# Patient Record
Sex: FEMALE | Race: WHITE | NOT HISPANIC OR LATINO
[De-identification: names, ages, dates, MRNs, and addresses within clinical notes are randomized per-mention and may not be internally consistent; named-entity substitution may affect disease eponyms.]

---

## 2018-11-20 ENCOUNTER — RESULT REVIEW (OUTPATIENT)
Age: 83
End: 2018-11-20

## 2021-07-09 ENCOUNTER — EMERGENCY (EMERGENCY)
Facility: HOSPITAL | Age: 86
LOS: 1 days | Discharge: HOME | End: 2021-07-09
Attending: EMERGENCY MEDICINE | Admitting: EMERGENCY MEDICINE
Payer: MEDICARE

## 2021-07-09 VITALS
RESPIRATION RATE: 17 BRPM | DIASTOLIC BLOOD PRESSURE: 73 MMHG | OXYGEN SATURATION: 95 % | HEART RATE: 56 BPM | SYSTOLIC BLOOD PRESSURE: 159 MMHG | TEMPERATURE: 98 F

## 2021-07-09 VITALS
DIASTOLIC BLOOD PRESSURE: 75 MMHG | SYSTOLIC BLOOD PRESSURE: 157 MMHG | HEIGHT: 61 IN | OXYGEN SATURATION: 97 % | HEART RATE: 62 BPM | TEMPERATURE: 98 F | WEIGHT: 106.92 LBS | RESPIRATION RATE: 16 BRPM

## 2021-07-09 LAB
ALBUMIN SERPL ELPH-MCNC: 3.4 G/DL — SIGNIFICANT CHANGE UP (ref 3.3–5)
ALP SERPL-CCNC: 43 U/L — SIGNIFICANT CHANGE UP (ref 30–120)
ALT FLD-CCNC: 15 U/L DA — SIGNIFICANT CHANGE UP (ref 10–60)
ANION GAP SERPL CALC-SCNC: 4 MMOL/L — LOW (ref 5–17)
APPEARANCE UR: ABNORMAL
AST SERPL-CCNC: 17 U/L — SIGNIFICANT CHANGE UP (ref 10–40)
BACTERIA # UR AUTO: ABNORMAL
BASOPHILS # BLD AUTO: 0.02 K/UL — SIGNIFICANT CHANGE UP (ref 0–0.2)
BASOPHILS NFR BLD AUTO: 0.3 % — SIGNIFICANT CHANGE UP (ref 0–2)
BILIRUB SERPL-MCNC: 0.4 MG/DL — SIGNIFICANT CHANGE UP (ref 0.2–1.2)
BILIRUB UR-MCNC: NEGATIVE — SIGNIFICANT CHANGE UP
BUN SERPL-MCNC: 17 MG/DL — SIGNIFICANT CHANGE UP (ref 7–23)
CALCIUM SERPL-MCNC: 9 MG/DL — SIGNIFICANT CHANGE UP (ref 8.4–10.5)
CHLORIDE SERPL-SCNC: 105 MMOL/L — SIGNIFICANT CHANGE UP (ref 96–108)
CO2 SERPL-SCNC: 33 MMOL/L — HIGH (ref 22–31)
COLOR SPEC: YELLOW — SIGNIFICANT CHANGE UP
CREAT SERPL-MCNC: 0.74 MG/DL — SIGNIFICANT CHANGE UP (ref 0.5–1.3)
DIFF PNL FLD: ABNORMAL
EOSINOPHIL # BLD AUTO: 0.08 K/UL — SIGNIFICANT CHANGE UP (ref 0–0.5)
EOSINOPHIL NFR BLD AUTO: 1.2 % — SIGNIFICANT CHANGE UP (ref 0–6)
EPI CELLS # UR: SIGNIFICANT CHANGE UP
GLUCOSE SERPL-MCNC: 92 MG/DL — SIGNIFICANT CHANGE UP (ref 70–99)
GLUCOSE UR QL: NEGATIVE MG/DL — SIGNIFICANT CHANGE UP
HCT VFR BLD CALC: 38.3 % — SIGNIFICANT CHANGE UP (ref 34.5–45)
HGB BLD-MCNC: 12.5 G/DL — SIGNIFICANT CHANGE UP (ref 11.5–15.5)
IMM GRANULOCYTES NFR BLD AUTO: 0.1 % — SIGNIFICANT CHANGE UP (ref 0–1.5)
KETONES UR-MCNC: NEGATIVE — SIGNIFICANT CHANGE UP
LEUKOCYTE ESTERASE UR-ACNC: ABNORMAL
LYMPHOCYTES # BLD AUTO: 2.15 K/UL — SIGNIFICANT CHANGE UP (ref 1–3.3)
LYMPHOCYTES # BLD AUTO: 31.2 % — SIGNIFICANT CHANGE UP (ref 13–44)
MCHC RBC-ENTMCNC: 30.9 PG — SIGNIFICANT CHANGE UP (ref 27–34)
MCHC RBC-ENTMCNC: 32.6 GM/DL — SIGNIFICANT CHANGE UP (ref 32–36)
MCV RBC AUTO: 94.6 FL — SIGNIFICANT CHANGE UP (ref 80–100)
MONOCYTES # BLD AUTO: 0.49 K/UL — SIGNIFICANT CHANGE UP (ref 0–0.9)
MONOCYTES NFR BLD AUTO: 7.1 % — SIGNIFICANT CHANGE UP (ref 2–14)
NEUTROPHILS # BLD AUTO: 4.14 K/UL — SIGNIFICANT CHANGE UP (ref 1.8–7.4)
NEUTROPHILS NFR BLD AUTO: 60.1 % — SIGNIFICANT CHANGE UP (ref 43–77)
NITRITE UR-MCNC: NEGATIVE — SIGNIFICANT CHANGE UP
NRBC # BLD: 0 /100 WBCS — SIGNIFICANT CHANGE UP (ref 0–0)
PH UR: 7 — SIGNIFICANT CHANGE UP (ref 5–8)
PLATELET # BLD AUTO: 179 K/UL — SIGNIFICANT CHANGE UP (ref 150–400)
POTASSIUM SERPL-MCNC: 4.2 MMOL/L — SIGNIFICANT CHANGE UP (ref 3.5–5.3)
POTASSIUM SERPL-SCNC: 4.2 MMOL/L — SIGNIFICANT CHANGE UP (ref 3.5–5.3)
PROT SERPL-MCNC: 6.7 G/DL — SIGNIFICANT CHANGE UP (ref 6–8.3)
PROT UR-MCNC: 30 MG/DL
RBC # BLD: 4.05 M/UL — SIGNIFICANT CHANGE UP (ref 3.8–5.2)
RBC # FLD: 12.8 % — SIGNIFICANT CHANGE UP (ref 10.3–14.5)
RBC CASTS # UR COMP ASSIST: ABNORMAL /HPF (ref 0–4)
SODIUM SERPL-SCNC: 142 MMOL/L — SIGNIFICANT CHANGE UP (ref 135–145)
SP GR SPEC: 1 — LOW (ref 1.01–1.02)
TROPONIN I SERPL-MCNC: 0 NG/ML — LOW (ref 0.02–0.06)
UROBILINOGEN FLD QL: NEGATIVE MG/DL — SIGNIFICANT CHANGE UP
WBC # BLD: 6.89 K/UL — SIGNIFICANT CHANGE UP (ref 3.8–10.5)
WBC # FLD AUTO: 6.89 K/UL — SIGNIFICANT CHANGE UP (ref 3.8–10.5)
WBC UR QL: ABNORMAL

## 2021-07-09 PROCEDURE — 74174 CTA ABD&PLVS W/CONTRAST: CPT | Mod: 26,ME

## 2021-07-09 PROCEDURE — 93010 ELECTROCARDIOGRAM REPORT: CPT

## 2021-07-09 PROCEDURE — 71275 CT ANGIOGRAPHY CHEST: CPT | Mod: 26,MG

## 2021-07-09 PROCEDURE — 74174 CTA ABD&PLVS W/CONTRAST: CPT

## 2021-07-09 PROCEDURE — 99284 EMERGENCY DEPT VISIT MOD MDM: CPT | Mod: 25

## 2021-07-09 PROCEDURE — 36415 COLL VENOUS BLD VENIPUNCTURE: CPT

## 2021-07-09 PROCEDURE — 81001 URINALYSIS AUTO W/SCOPE: CPT

## 2021-07-09 PROCEDURE — 99285 EMERGENCY DEPT VISIT HI MDM: CPT

## 2021-07-09 PROCEDURE — 80053 COMPREHEN METABOLIC PANEL: CPT

## 2021-07-09 PROCEDURE — G1004: CPT

## 2021-07-09 PROCEDURE — 96366 THER/PROPH/DIAG IV INF ADDON: CPT

## 2021-07-09 PROCEDURE — 71275 CT ANGIOGRAPHY CHEST: CPT

## 2021-07-09 PROCEDURE — 85025 COMPLETE CBC W/AUTO DIFF WBC: CPT

## 2021-07-09 PROCEDURE — 93005 ELECTROCARDIOGRAM TRACING: CPT

## 2021-07-09 PROCEDURE — 96375 TX/PRO/DX INJ NEW DRUG ADDON: CPT

## 2021-07-09 PROCEDURE — 84484 ASSAY OF TROPONIN QUANT: CPT

## 2021-07-09 PROCEDURE — 96365 THER/PROPH/DIAG IV INF INIT: CPT | Mod: XU

## 2021-07-09 RX ORDER — KETOROLAC TROMETHAMINE 30 MG/ML
30 SYRINGE (ML) INJECTION ONCE
Refills: 0 | Status: DISCONTINUED | OUTPATIENT
Start: 2021-07-09 | End: 2021-07-09

## 2021-07-09 RX ORDER — CEFTRIAXONE 500 MG/1
1000 INJECTION, POWDER, FOR SOLUTION INTRAMUSCULAR; INTRAVENOUS ONCE
Refills: 0 | Status: COMPLETED | OUTPATIENT
Start: 2021-07-09 | End: 2021-07-09

## 2021-07-09 RX ORDER — LIDOCAINE 4 G/100G
1 CREAM TOPICAL
Qty: 10 | Refills: 0
Start: 2021-07-09 | End: 2021-07-18

## 2021-07-09 RX ORDER — CEFPODOXIME PROXETIL 100 MG
1 TABLET ORAL
Qty: 14 | Refills: 0
Start: 2021-07-09 | End: 2021-07-15

## 2021-07-09 RX ORDER — MORPHINE SULFATE 50 MG/1
2 CAPSULE, EXTENDED RELEASE ORAL ONCE
Refills: 0 | Status: DISCONTINUED | OUTPATIENT
Start: 2021-07-09 | End: 2021-07-09

## 2021-07-09 RX ADMIN — Medication 30 MILLIGRAM(S): at 16:40

## 2021-07-09 RX ADMIN — CEFTRIAXONE 1000 MILLIGRAM(S): 500 INJECTION, POWDER, FOR SOLUTION INTRAMUSCULAR; INTRAVENOUS at 17:39

## 2021-07-09 RX ADMIN — MORPHINE SULFATE 2 MILLIGRAM(S): 50 CAPSULE, EXTENDED RELEASE ORAL at 15:55

## 2021-07-09 RX ADMIN — Medication 30 MILLIGRAM(S): at 17:00

## 2021-07-09 RX ADMIN — CEFTRIAXONE 100 MILLIGRAM(S): 500 INJECTION, POWDER, FOR SOLUTION INTRAMUSCULAR; INTRAVENOUS at 16:44

## 2021-07-09 RX ADMIN — MORPHINE SULFATE 2 MILLIGRAM(S): 50 CAPSULE, EXTENDED RELEASE ORAL at 15:37

## 2021-07-09 NOTE — ED PROVIDER NOTE - CARE PLAN
Principal Discharge DX:	UTI (urinary tract infection)  Secondary Diagnosis:	Musculoskeletal back pain

## 2021-07-09 NOTE — ED ADULT NURSE NOTE - NSIMPLEMENTINTERV_GEN_ALL_ED
Implemented All Universal Safety Interventions:  [place] to call system. Call bell, personal items and telephone within reach. Instruct patient to call for assistance. Room bathroom lighting operational. Non-slip footwear when patient is off stretcher. Physically safe environment: no spills, clutter or unnecessary equipment. Stretcher in lowest position, wheels locked, appropriate side rails in place.

## 2021-07-09 NOTE — ED PROVIDER NOTE - NSFOLLOWUPINSTRUCTIONS_ED_ALL_ED_FT
Back Pain    Back pain is very common in adults. The cause of back pain is rarely dangerous and the pain often gets better over time. The cause of your back pain may not be known and may include strain of muscles or ligaments, degeneration of the spinal disks, or arthritis. Occasionally the pain may radiate down your leg(s). Over-the-counter medicines to reduce pain and inflammation are often the most helpful. Stretching and remaining active frequently helps the healing process.     SEEK IMMEDIATE MEDICAL CARE IF YOU HAVE ANY OF THE FOLLOWING SYMPTOMS: bowel or bladder control problems, unusual weakness or numbness in your arms or legs, nausea or vomiting, abdominal pain, fever, dizziness/lightheadedness.      Urinary Tract Infection    A urinary tract infection (UTI) is an infection of any part of the urinary tract, which includes the kidneys, ureters, bladder, and urethra. Risk factors include ignoring your need to urinate, wiping back to front if female, being an uncircumcised male, and having diabetes or a weak immune system. Symptoms include frequent urination, pain or burning with urination, foul smelling urine, cloudy urine, pain in the lower abdomen, blood in the urine, and fever. If you were prescribed an antibiotic medicine, take it as told by your health care provider. Do not stop taking the antibiotic even if you start to feel better.    SEEK IMMEDIATE MEDICAL CARE IF YOU HAVE ANY OF THE FOLLOWING SYMPTOMS: severe back or abdominal pain, fever, inability to keep fluids or medicine down, dizziness/lightheadedness, or a change in mental status.    1. TAKE ALL MEDICATIONS AS DIRECTED.  FOR PAIN YOU CAN ALSO TAKE IBUPROFEN (MOTRIN, ADVIL) OR ACETAMINOPHEN (TYLENOL) AS NEEDED, AS DIRECTED ON PACKAGING. REST APPLY HEATING PAD AS NEEDED. NO HEAVY LIFTING OR STRENUOUS ACTIVITY  2. FOLLOW UP WITH __YOUR PRIMARY CARE PROVIDER AND SPINE ORTHOPEDIST________ AS DIRECTED.  YOU WERE GIVEN COPIES OF ALL LABS AND IMAGING RESULTS FROM YOUR ER VISIT--PLEASE TAKE THEM WITH YOU TO YOUR APPOINTMENT.  3. IF NEEDED, CALL [phone] TO FIND A PRIMARY CARE PHYSICIAN.  OR CALL [phone] TO MAKE AN APPOINTMENT WITH THE MEDICAL CLINIC.  4. RETURN TO THE ER FOR ANY WORSENING SYMPTOMS.

## 2021-07-09 NOTE — ED PROVIDER NOTE - PATIENT PORTAL LINK FT
You can access the FollowMyHealth Patient Portal offered by [facility] by registering at the following website: [email or web address]. By joining [facility]’s FollowMyHealth portal, you will also be able to view your health information using other applications (apps) compatible with our system.

## 2021-07-09 NOTE — ED PROVIDER NOTE - OBJECTIVE STATEMENT
88 y/o F poor historian states here for back pain that started today suddenly. Denies fall or injury. Denies heavy lifting. States had back pain in the past but never this bad. Denies nausea vomiting chest pain but pain radiates to L shoulder. Denies hematuria and dysuria. Pain worse with movement but unable to further explain can not quantify pain. States took Tylenol with no relief.

## 2021-07-09 NOTE — ED PROVIDER NOTE - PHYSICAL EXAMINATION
no CVAT  pelvis stable  no midline c/t/l spine tenderness   pt in mild distress rolling around stretcher minimally cooperative with exam

## 2021-07-09 NOTE — ED PROVIDER NOTE - CLINICAL SUMMARY MEDICAL DECISION MAKING FREE TEXT BOX
88 y/o F with back pain, states started today when she wasn't doing anything, denies fever chills hematuria or dysuria, patient unable to describe the pain well, denies CP nausea vomiting, the pain radiates to shoulder on xa pain seems to be colicky in nature worse with  movement, plan= will do labs urine and CT to r.o stone but also concern for dissection as pt repots pain radiates to shoulder

## 2021-07-09 NOTE — ED PROVIDER NOTE - CARE PROVIDER_API CALL
Gary Chavis  ORTHOPAEDIC SURGERY  [facility], [address] [place] [address]  Phone: [phone]  Fax: [phone]  Follow Up Time: 1-3 Days

## 2021-07-09 NOTE — ED PROVIDER NOTE - EKG #1 DATE/TIME
Pt also needs order for MRI of right knee to be done at [facility]. 09-Jul-2021 15:50

## 2021-07-09 NOTE — ED PROVIDER NOTE - PROGRESS NOTE DETAILS
Ramos CHESTER for ED attending, Dr. Barfield: 86 y/o F w/ no pertinent PMHx presents to ED c/o back pain. Denies numbness . Pt is COVID vaccinated. PCP: Rober Trivedi Reevaluated patient at bedside.  Patient feeling much improved. Daughter now at [facility]. States her pain is chronic this has been going on for a while, take 7.5mg vicodins twice daily with no relief. Feels this is just progression of her arthritis. Feels pt needs higher dose of pain meds. Discussed the results of all diagnostic testing in ED and copies of all reports given.   An opportunity to ask questions was given.  Discussed the importance of prompt, close medical follow-up.  Advised should follow up with spine. Patient will return with any changes, concerns or persistent / worsening symptoms.  Understanding of all instructions verbalized. Ramos CHESTER for ED attending, Dr. Barfield: 86 y/o F w/ no pertinent PMHx presents to ED c/o back pain. Denies numbness . No fall / direct trauma. Pt with hx similar chronic pain per family. No cough/uri. No known covid exposuers. Pt is COVID vaccinated. No URI sx. No numb/ting/focal weak. NO recent illness. no other  inj or co. PCP: Rober Trivedi  Exam: MM Moist. neck supple. no meningeal signs. Pos tend to L lower thoracic back, L>R, nl rsp effort. cta bl no w/r/r. Nl cardiac exam. abd soft NT. no hsm. no cvat. nl non-focal neuro exam. No other acute findings. check labs, xr, CT, reeval

## 2022-03-03 NOTE — ED ADULT TRIAGE NOTE - BSA (M2)
[de-identified] : The right elbow was prepped with alcohol and ethyl chloride was used to numb the skin. A 3 cc injection with 1 cc xylocaine, 1cc sensoricaine and 1 cc depomedrol , was given without complication into the [medial/latera] epicondyle. Patient instructed that there may be an inflammatory flare for 24 hrs , to use ice or advil if needed\par \par 
1.45

## 2022-07-24 ENCOUNTER — INPATIENT (INPATIENT)
Facility: HOSPITAL | Age: 87
LOS: 2 days | Discharge: HOME | DRG: 392 | End: 2022-07-27
Attending: FAMILY MEDICINE | Admitting: STUDENT IN AN ORGANIZED HEALTH CARE EDUCATION/TRAINING PROGRAM
Payer: MEDICARE

## 2022-07-24 VITALS
DIASTOLIC BLOOD PRESSURE: 63 MMHG | HEART RATE: 76 BPM | RESPIRATION RATE: 16 BRPM | WEIGHT: 110.01 LBS | OXYGEN SATURATION: 96 % | TEMPERATURE: 98 F | SYSTOLIC BLOOD PRESSURE: 137 MMHG | HEIGHT: 61 IN

## 2022-07-24 DIAGNOSIS — Z98.890 OTHER SPECIFIED POSTPROCEDURAL STATES: Chronic | ICD-10-CM

## 2022-07-24 DIAGNOSIS — Z90.49 ACQUIRED ABSENCE OF OTHER SPECIFIED PARTS OF DIGESTIVE TRACT: Chronic | ICD-10-CM

## 2022-07-24 LAB
ALBUMIN SERPL ELPH-MCNC: 3.3 G/DL — SIGNIFICANT CHANGE UP (ref 3.3–5)
ALP SERPL-CCNC: 51 U/L — SIGNIFICANT CHANGE UP (ref 40–120)
ALT FLD-CCNC: 9 U/L — LOW (ref 12–78)
ANION GAP SERPL CALC-SCNC: 5 MMOL/L — SIGNIFICANT CHANGE UP (ref 5–17)
AST SERPL-CCNC: 18 U/L — SIGNIFICANT CHANGE UP (ref 15–37)
BASOPHILS # BLD AUTO: 0.03 K/UL — SIGNIFICANT CHANGE UP (ref 0–0.2)
BASOPHILS NFR BLD AUTO: 0.3 % — SIGNIFICANT CHANGE UP (ref 0–2)
BILIRUB SERPL-MCNC: 0.7 MG/DL — SIGNIFICANT CHANGE UP (ref 0.2–1.2)
BUN SERPL-MCNC: 20 MG/DL — SIGNIFICANT CHANGE UP (ref 7–23)
CALCIUM SERPL-MCNC: 9.1 MG/DL — SIGNIFICANT CHANGE UP (ref 8.5–10.1)
CHLORIDE SERPL-SCNC: 103 MMOL/L — SIGNIFICANT CHANGE UP (ref 96–108)
CO2 SERPL-SCNC: 29 MMOL/L — SIGNIFICANT CHANGE UP (ref 22–31)
CREAT SERPL-MCNC: 0.7 MG/DL — SIGNIFICANT CHANGE UP (ref 0.5–1.3)
EGFR: 83 ML/MIN/1.73M2 — SIGNIFICANT CHANGE UP
EOSINOPHIL # BLD AUTO: 0.05 K/UL — SIGNIFICANT CHANGE UP (ref 0–0.5)
EOSINOPHIL NFR BLD AUTO: 0.5 % — SIGNIFICANT CHANGE UP (ref 0–6)
GLUCOSE SERPL-MCNC: 105 MG/DL — HIGH (ref 70–99)
HCT VFR BLD CALC: 34.5 % — SIGNIFICANT CHANGE UP (ref 34.5–45)
HGB BLD-MCNC: 11.3 G/DL — LOW (ref 11.5–15.5)
IMM GRANULOCYTES NFR BLD AUTO: 0.3 % — SIGNIFICANT CHANGE UP (ref 0–1.5)
LACTATE SERPL-SCNC: 0.9 MMOL/L — SIGNIFICANT CHANGE UP (ref 0.7–2)
LIDOCAIN IGE QN: 134 U/L — SIGNIFICANT CHANGE UP (ref 73–393)
LYMPHOCYTES # BLD AUTO: 2.31 K/UL — SIGNIFICANT CHANGE UP (ref 1–3.3)
LYMPHOCYTES # BLD AUTO: 22 % — SIGNIFICANT CHANGE UP (ref 13–44)
MCHC RBC-ENTMCNC: 31.1 PG — SIGNIFICANT CHANGE UP (ref 27–34)
MCHC RBC-ENTMCNC: 32.8 GM/DL — SIGNIFICANT CHANGE UP (ref 32–36)
MCV RBC AUTO: 95 FL — SIGNIFICANT CHANGE UP (ref 80–100)
MONOCYTES # BLD AUTO: 0.95 K/UL — HIGH (ref 0–0.9)
MONOCYTES NFR BLD AUTO: 9 % — SIGNIFICANT CHANGE UP (ref 2–14)
NEUTROPHILS # BLD AUTO: 7.14 K/UL — SIGNIFICANT CHANGE UP (ref 1.8–7.4)
NEUTROPHILS NFR BLD AUTO: 67.9 % — SIGNIFICANT CHANGE UP (ref 43–77)
NRBC # BLD: 0 /100 WBCS — SIGNIFICANT CHANGE UP (ref 0–0)
PLATELET # BLD AUTO: 224 K/UL — SIGNIFICANT CHANGE UP (ref 150–400)
POTASSIUM SERPL-MCNC: 5.2 MMOL/L — SIGNIFICANT CHANGE UP (ref 3.5–5.3)
POTASSIUM SERPL-SCNC: 5.2 MMOL/L — SIGNIFICANT CHANGE UP (ref 3.5–5.3)
PROT SERPL-MCNC: 7.6 G/DL — SIGNIFICANT CHANGE UP (ref 6–8.3)
RBC # BLD: 3.63 M/UL — LOW (ref 3.8–5.2)
RBC # FLD: 13.3 % — SIGNIFICANT CHANGE UP (ref 10.3–14.5)
SODIUM SERPL-SCNC: 137 MMOL/L — SIGNIFICANT CHANGE UP (ref 135–145)
WBC # BLD: 10.51 K/UL — HIGH (ref 3.8–10.5)
WBC # FLD AUTO: 10.51 K/UL — HIGH (ref 3.8–10.5)

## 2022-07-24 PROCEDURE — 71045 X-RAY EXAM CHEST 1 VIEW: CPT | Mod: 26

## 2022-07-24 PROCEDURE — 93010 ELECTROCARDIOGRAM REPORT: CPT

## 2022-07-24 PROCEDURE — 99285 EMERGENCY DEPT VISIT HI MDM: CPT

## 2022-07-24 RX ORDER — ACETAMINOPHEN 500 MG
1000 TABLET ORAL ONCE
Refills: 0 | Status: COMPLETED | OUTPATIENT
Start: 2022-07-24 | End: 2022-07-24

## 2022-07-24 RX ORDER — SODIUM CHLORIDE 9 MG/ML
1000 INJECTION INTRAMUSCULAR; INTRAVENOUS; SUBCUTANEOUS ONCE
Refills: 0 | Status: COMPLETED | OUTPATIENT
Start: 2022-07-24 | End: 2022-07-24

## 2022-07-24 RX ADMIN — Medication 400 MILLIGRAM(S): at 23:19

## 2022-07-24 RX ADMIN — SODIUM CHLORIDE 1000 MILLILITER(S): 9 INJECTION INTRAMUSCULAR; INTRAVENOUS; SUBCUTANEOUS at 23:20

## 2022-07-24 RX ADMIN — Medication 1000 MILLIGRAM(S): at 23:34

## 2022-07-24 NOTE — ED PROVIDER NOTE - OBJECTIVE STATEMENT
87yo F ho chronic back pain on tramadol and celecoxib pw 3 days worsneing lower abd pain, no nausea, vomiting, fevers, chills, no urinary sx, + normal BM, + ho appendectomy and hernia repair, no chest pain or sob 89yo F ho chronic back pain on tramadol and celecoxib pw 3 days worsening lower abd pain, no nausea, vomiting, fevers, chills, no urinary sx, + normal BM, + ho appendectomy and hernia repair, no chest pain or sob

## 2022-07-24 NOTE — ED ADULT NURSE NOTE - OBJECTIVE STATEMENT
stated she had abdominal pain for 3 days> left upper quad and mid 10/10 worsening. Denies nausea or vomiting. Last BM was small> yesterday. No fever or difficulty urinating, no diarrhea stated she had abdominal pain for 3 days> left upper quad and mid 10/10 worsening. Denies nausea or vomiting. Last BM was small> yesterday. No fever or difficulty urinating, no diarrhea  0725: Received pt alert and oriented resting in stretcher.  Pt denies n/v, states abd discomfort is currently at an acceptable level.   Respirations even and unlabored at this time.  Pt appears in no acute distress, watching TV, offers no complaints.  Will continue frequent monitoring.  BN stated she had abdominal pain for 3 days> left upper quad and mid 10/10 worsening. Denies nausea or vomiting. Last BM was small> yesterday. No fever or difficulty urinating, no diarrhea  0725: Received pt alert and oriented resting in stretcher.  Pt denies n/v, states abd discomfort is currently at an acceptable level.   Respirations even and unlabored at this time.  Pt appears in no acute distress, watching TV, offers no complaints.  Will continue frequent monitoring.  BN  1400: Pt oob ambulatory to BR with steady gait respirtaitons even and unlabored.   Pt calm and cooperative denies need for pain meds at this time. BN   1834: pt tolerating liquid diet at this time, denies acute abd pain, pt calm watching tv safety maintained. BN

## 2022-07-24 NOTE — ED PROVIDER NOTE - PHYSICAL EXAMINATION
Gen: Well appearing in NAD  Head: NC/AT  Neck: trachea midline  Resp:  No distress, wheezes on right side  abd: tender in lower abdomen L>R  Ext: no deformities  Neuro:  A&O appears non focal  Skin:  Warm and dry as visualized  Psych:  Normal affect and mood

## 2022-07-25 DIAGNOSIS — Z29.9 ENCOUNTER FOR PROPHYLACTIC MEASURES, UNSPECIFIED: ICD-10-CM

## 2022-07-25 DIAGNOSIS — K57.92 DIVERTICULITIS OF INTESTINE, PART UNSPECIFIED, WITHOUT PERFORATION OR ABSCESS WITHOUT BLEEDING: ICD-10-CM

## 2022-07-25 DIAGNOSIS — M54.9 DORSALGIA, UNSPECIFIED: ICD-10-CM

## 2022-07-25 DIAGNOSIS — N39.0 URINARY TRACT INFECTION, SITE NOT SPECIFIED: ICD-10-CM

## 2022-07-25 DIAGNOSIS — R82.90 UNSPECIFIED ABNORMAL FINDINGS IN URINE: ICD-10-CM

## 2022-07-25 DIAGNOSIS — D64.9 ANEMIA, UNSPECIFIED: ICD-10-CM

## 2022-07-25 LAB
ALBUMIN SERPL ELPH-MCNC: 2.7 G/DL — LOW (ref 3.3–5)
ALP SERPL-CCNC: 44 U/L — SIGNIFICANT CHANGE UP (ref 40–120)
ALT FLD-CCNC: 7 U/L — LOW (ref 12–78)
ANION GAP SERPL CALC-SCNC: 6 MMOL/L — SIGNIFICANT CHANGE UP (ref 5–17)
APPEARANCE UR: ABNORMAL
APTT BLD: 25.4 SEC — LOW (ref 27.5–35.5)
AST SERPL-CCNC: 14 U/L — LOW (ref 15–37)
BACTERIA # UR AUTO: ABNORMAL
BASOPHILS # BLD AUTO: 0.02 K/UL — SIGNIFICANT CHANGE UP (ref 0–0.2)
BASOPHILS NFR BLD AUTO: 0.3 % — SIGNIFICANT CHANGE UP (ref 0–2)
BILIRUB SERPL-MCNC: 0.7 MG/DL — SIGNIFICANT CHANGE UP (ref 0.2–1.2)
BILIRUB UR-MCNC: NEGATIVE — SIGNIFICANT CHANGE UP
BLD GP AB SCN SERPL QL: SIGNIFICANT CHANGE UP
BUN SERPL-MCNC: 16 MG/DL — SIGNIFICANT CHANGE UP (ref 7–23)
CALCIUM SERPL-MCNC: 8.1 MG/DL — LOW (ref 8.5–10.1)
CHLORIDE SERPL-SCNC: 109 MMOL/L — HIGH (ref 96–108)
CO2 SERPL-SCNC: 27 MMOL/L — SIGNIFICANT CHANGE UP (ref 22–31)
COLOR SPEC: YELLOW — SIGNIFICANT CHANGE UP
COMMENT - URINE: SIGNIFICANT CHANGE UP
CREAT SERPL-MCNC: 0.53 MG/DL — SIGNIFICANT CHANGE UP (ref 0.5–1.3)
DIFF PNL FLD: ABNORMAL
EGFR: 89 ML/MIN/1.73M2 — SIGNIFICANT CHANGE UP
EOSINOPHIL # BLD AUTO: 0.11 K/UL — SIGNIFICANT CHANGE UP (ref 0–0.5)
EOSINOPHIL NFR BLD AUTO: 1.4 % — SIGNIFICANT CHANGE UP (ref 0–6)
EPI CELLS # UR: SIGNIFICANT CHANGE UP
GLUCOSE SERPL-MCNC: 98 MG/DL — SIGNIFICANT CHANGE UP (ref 70–99)
GLUCOSE UR QL: NEGATIVE — SIGNIFICANT CHANGE UP
HCT VFR BLD CALC: 32 % — LOW (ref 34.5–45)
HGB BLD-MCNC: 10.5 G/DL — LOW (ref 11.5–15.5)
IMM GRANULOCYTES NFR BLD AUTO: 0.4 % — SIGNIFICANT CHANGE UP (ref 0–1.5)
INR BLD: 1.24 RATIO — HIGH (ref 0.88–1.16)
KETONES UR-MCNC: NEGATIVE — SIGNIFICANT CHANGE UP
LEUKOCYTE ESTERASE UR-ACNC: ABNORMAL
LYMPHOCYTES # BLD AUTO: 1.65 K/UL — SIGNIFICANT CHANGE UP (ref 1–3.3)
LYMPHOCYTES # BLD AUTO: 21 % — SIGNIFICANT CHANGE UP (ref 13–44)
MCHC RBC-ENTMCNC: 31.3 PG — SIGNIFICANT CHANGE UP (ref 27–34)
MCHC RBC-ENTMCNC: 32.8 GM/DL — SIGNIFICANT CHANGE UP (ref 32–36)
MCV RBC AUTO: 95.5 FL — SIGNIFICANT CHANGE UP (ref 80–100)
MONOCYTES # BLD AUTO: 0.68 K/UL — SIGNIFICANT CHANGE UP (ref 0–0.9)
MONOCYTES NFR BLD AUTO: 8.7 % — SIGNIFICANT CHANGE UP (ref 2–14)
NEUTROPHILS # BLD AUTO: 5.37 K/UL — SIGNIFICANT CHANGE UP (ref 1.8–7.4)
NEUTROPHILS NFR BLD AUTO: 68.2 % — SIGNIFICANT CHANGE UP (ref 43–77)
NITRITE UR-MCNC: NEGATIVE — SIGNIFICANT CHANGE UP
NRBC # BLD: 0 /100 WBCS — SIGNIFICANT CHANGE UP (ref 0–0)
NT-PROBNP SERPL-SCNC: 611 PG/ML — HIGH (ref 0–450)
PH UR: 6 — SIGNIFICANT CHANGE UP (ref 5–8)
PLATELET # BLD AUTO: 196 K/UL — SIGNIFICANT CHANGE UP (ref 150–400)
POTASSIUM SERPL-MCNC: 3.8 MMOL/L — SIGNIFICANT CHANGE UP (ref 3.5–5.3)
POTASSIUM SERPL-SCNC: 3.8 MMOL/L — SIGNIFICANT CHANGE UP (ref 3.5–5.3)
PROT SERPL-MCNC: 6.4 G/DL — SIGNIFICANT CHANGE UP (ref 6–8.3)
PROT UR-MCNC: NEGATIVE — SIGNIFICANT CHANGE UP
PROTHROM AB SERPL-ACNC: 14.5 SEC — HIGH (ref 10.5–13.4)
RBC # BLD: 3.35 M/UL — LOW (ref 3.8–5.2)
RBC # FLD: 13.4 % — SIGNIFICANT CHANGE UP (ref 10.3–14.5)
RBC CASTS # UR COMP ASSIST: ABNORMAL /HPF (ref 0–4)
SARS-COV-2 RNA SPEC QL NAA+PROBE: SIGNIFICANT CHANGE UP
SODIUM SERPL-SCNC: 142 MMOL/L — SIGNIFICANT CHANGE UP (ref 135–145)
SP GR SPEC: 1 — LOW (ref 1.01–1.02)
UROBILINOGEN FLD QL: NEGATIVE — SIGNIFICANT CHANGE UP
WBC # BLD: 7.86 K/UL — SIGNIFICANT CHANGE UP (ref 3.8–10.5)
WBC # FLD AUTO: 7.86 K/UL — SIGNIFICANT CHANGE UP (ref 3.8–10.5)
WBC UR QL: >50

## 2022-07-25 PROCEDURE — 74177 CT ABD & PELVIS W/CONTRAST: CPT | Mod: 26,MA

## 2022-07-25 PROCEDURE — 99222 1ST HOSP IP/OBS MODERATE 55: CPT

## 2022-07-25 PROCEDURE — 99223 1ST HOSP IP/OBS HIGH 75: CPT | Mod: GC

## 2022-07-25 RX ORDER — PIPERACILLIN AND TAZOBACTAM 4; .5 G/20ML; G/20ML
3.38 INJECTION, POWDER, LYOPHILIZED, FOR SOLUTION INTRAVENOUS EVERY 8 HOURS
Refills: 0 | Status: DISCONTINUED | OUTPATIENT
Start: 2022-07-25 | End: 2022-07-27

## 2022-07-25 RX ORDER — TRAMADOL HYDROCHLORIDE 50 MG/1
1 TABLET ORAL
Qty: 0 | Refills: 0 | DISCHARGE

## 2022-07-25 RX ORDER — GABAPENTIN 400 MG/1
1 CAPSULE ORAL
Qty: 0 | Refills: 0 | DISCHARGE

## 2022-07-25 RX ORDER — HEPARIN SODIUM 5000 [USP'U]/ML
5000 INJECTION INTRAVENOUS; SUBCUTANEOUS EVERY 8 HOURS
Refills: 0 | Status: DISCONTINUED | OUTPATIENT
Start: 2022-07-25 | End: 2022-07-25

## 2022-07-25 RX ORDER — CELECOXIB 200 MG/1
100 CAPSULE ORAL DAILY
Refills: 0 | Status: DISCONTINUED | OUTPATIENT
Start: 2022-07-25 | End: 2022-07-27

## 2022-07-25 RX ORDER — SODIUM CHLORIDE 9 MG/ML
1000 INJECTION, SOLUTION INTRAVENOUS
Refills: 0 | Status: DISCONTINUED | OUTPATIENT
Start: 2022-07-25 | End: 2022-07-25

## 2022-07-25 RX ORDER — ONDANSETRON 8 MG/1
4 TABLET, FILM COATED ORAL EVERY 8 HOURS
Refills: 0 | Status: DISCONTINUED | OUTPATIENT
Start: 2022-07-25 | End: 2022-07-27

## 2022-07-25 RX ORDER — ACETAMINOPHEN 500 MG
650 TABLET ORAL EVERY 6 HOURS
Refills: 0 | Status: DISCONTINUED | OUTPATIENT
Start: 2022-07-25 | End: 2022-07-27

## 2022-07-25 RX ORDER — PIPERACILLIN AND TAZOBACTAM 4; .5 G/20ML; G/20ML
3.38 INJECTION, POWDER, LYOPHILIZED, FOR SOLUTION INTRAVENOUS ONCE
Refills: 0 | Status: COMPLETED | OUTPATIENT
Start: 2022-07-25 | End: 2022-07-25

## 2022-07-25 RX ORDER — LANOLIN ALCOHOL/MO/W.PET/CERES
3 CREAM (GRAM) TOPICAL AT BEDTIME
Refills: 0 | Status: DISCONTINUED | OUTPATIENT
Start: 2022-07-25 | End: 2022-07-27

## 2022-07-25 RX ORDER — TRAMADOL HYDROCHLORIDE 50 MG/1
50 TABLET ORAL EVERY 6 HOURS
Refills: 0 | Status: DISCONTINUED | OUTPATIENT
Start: 2022-07-25 | End: 2022-07-27

## 2022-07-25 RX ORDER — LIDOCAINE 4 G/100G
1 CREAM TOPICAL EVERY 24 HOURS
Refills: 0 | Status: DISCONTINUED | OUTPATIENT
Start: 2022-07-25 | End: 2022-07-27

## 2022-07-25 RX ORDER — ENOXAPARIN SODIUM 100 MG/ML
40 INJECTION SUBCUTANEOUS EVERY 24 HOURS
Refills: 0 | Status: DISCONTINUED | OUTPATIENT
Start: 2022-07-25 | End: 2022-07-27

## 2022-07-25 RX ORDER — TRAMADOL HYDROCHLORIDE 50 MG/1
50 TABLET ORAL EVERY 6 HOURS
Refills: 0 | Status: DISCONTINUED | OUTPATIENT
Start: 2022-07-25 | End: 2022-07-25

## 2022-07-25 RX ORDER — CELECOXIB 200 MG/1
1 CAPSULE ORAL
Qty: 0 | Refills: 0 | DISCHARGE

## 2022-07-25 RX ADMIN — TRAMADOL HYDROCHLORIDE 50 MILLIGRAM(S): 50 TABLET ORAL at 23:45

## 2022-07-25 RX ADMIN — ENOXAPARIN SODIUM 40 MILLIGRAM(S): 100 INJECTION SUBCUTANEOUS at 05:13

## 2022-07-25 RX ADMIN — SODIUM CHLORIDE 50 MILLILITER(S): 9 INJECTION, SOLUTION INTRAVENOUS at 05:13

## 2022-07-25 RX ADMIN — CELECOXIB 100 MILLIGRAM(S): 200 CAPSULE ORAL at 22:26

## 2022-07-25 RX ADMIN — PIPERACILLIN AND TAZOBACTAM 200 GRAM(S): 4; .5 INJECTION, POWDER, LYOPHILIZED, FOR SOLUTION INTRAVENOUS at 01:58

## 2022-07-25 RX ADMIN — PIPERACILLIN AND TAZOBACTAM 25 GRAM(S): 4; .5 INJECTION, POWDER, LYOPHILIZED, FOR SOLUTION INTRAVENOUS at 12:21

## 2022-07-25 RX ADMIN — TRAMADOL HYDROCHLORIDE 50 MILLIGRAM(S): 50 TABLET ORAL at 23:07

## 2022-07-25 RX ADMIN — PIPERACILLIN AND TAZOBACTAM 25 GRAM(S): 4; .5 INJECTION, POWDER, LYOPHILIZED, FOR SOLUTION INTRAVENOUS at 20:58

## 2022-07-25 NOTE — CONSULT NOTE ADULT - SUBJECTIVE AND OBJECTIVE BOX
HPI:  89 y/o F with PMHx chronic back pain (on Tramadol and Celebrex at home), s/p hernia repair and appendectomy presents with worsening abdominal pain x 2 days. Pain located in LLQ and described as severe and non-radiating. Normal BMs - last one yesterday morning. No change in appetite. Never had colonoscopy. Denies fever, chills, chest pain, shortness of breath, nausea, vomiting, diarrhea, urinary complaints, history of similar symptoms/diagnosis.    PAST MEDICAL & SURGICAL HISTORY:  Chronic back pain  S/P hernia repair  S/P appendectomy      SOCIAL:  Denies smoking and history of smoking. Denies ETOH use and drug use.     ALLERGIES:  No Known Allergies    Home Medications:  gabapentin 300 mg oral capsule: 1 cap(s) orally once a day (2022 21:41)  Percocet 7.5/325 oral tablet: 1 tab(s) orally once a day (2022 21:41)    REVIEW OF SYSTEMS:  CONSTITUTIONAL: No weakness, fevers or chills  EYES/ENT: No visual changes;  No vertigo or throat pain   NECK: No pain or stiffness  RESPIRATORY: No cough, wheezing, hemoptysis; No shortness of breath  CARDIOVASCULAR: No chest pain or palpitations  GASTROINTESTINAL: SEE HPI  GENITOURINARY: No dysuria, frequency or hematuria  NEUROLOGICAL: No numbness or weakness  SKIN: No itching, burning, rashes, or lesions   All other review of systems is negative unless indicated above.    Vital Signs Last 24 Hrs  T(C): 36.7 (2022 21:36), Max: 36.7 (2022 21:36)  T(F): 98.1 (2022 21:36), Max: 98.1 (2022 21:36)  HR: 76 (2022 21:36) (76 - 76)  BP: 137/63 (2022 21:36) (137/63 - 137/63)  BP(mean): --  RR: 16 (2022 21:36) (16 - 16)  SpO2: 96% (2022 21:36) (96% - 96%)    Parameters below as of 2022 21:36  Patient On (Oxygen Delivery Method): room air      PHYSICAL EXAM:  General: Elderly female, NAD, appears comfortable  HEAD: NC, AT  NECK: Supple  CHEST: Decreased BS b/l. Good inspiratory effort.  HEART: RRR. +S1/S2.  ABDOMEN: Soft. Moderate tenderness lower abdomen, L>R. No rebound or guarding. +BS  EXT: No calf tenderness b/l.  NEURO: A&Ox      LABS:                        11.3   10.51 )-----------( 224      ( 2022 23:24 )             34.5     PT/INR - ( 2022 23:24 )   PT: 14.5 sec;   INR: 1.24 ratio    PTT - ( 2022 23:24 )  PTT:25.4 sec    LIVER FUNCTIONS - ( 2022 23:24 )  Alb: 3.3 g/dL / Pro: 7.6 g/dL / ALK PHOS: 51 U/L / ALT: 9 U/L / AST: 18 U/L / GGT: x           Urinalysis Basic - ( 2022 00:46 )  Color: Yellow / Appearance: Slightly Turbid / S.005 / pH: x  Gluc: x / Ketone: Negative  / Bili: Negative / Urobili: Negative   Blood: x / Protein: Negative / Nitrite: Negative   Leuk Esterase: Moderate / RBC: 3-5 /HPF / WBC >50   Sq Epi: x / Non Sq Epi: Few / Bacteria: Occasional      RADIOLOGY/IMAGING:    < from: CT Abdomen and Pelvis w/ IV Cont (22 @ 01:27) >  ACC: [id number] EXAM:  CT ABDOMEN AND PELVIS IC                        PROCEDURE DATE:  2022    INTERPRETATION:  CLINICAL INFORMATION: Left lower quadrant pain.  COMPARISON: 2021.  CONTRAST/COMPLICATIONS:  IV Contrast: [id number]  90 cc administered   10 cc discarded  Oral Contrast: NONE  Complications: None reported at time of study completion  PROCEDURE:  CT of the Abdomen and Pelvis was performed without and with intravenous   contrast.  Sagittal and coronal reformats were performed.    FINDINGS:  LOWER CHEST: Bibasilar dependent and platelike atelectasis. Heart is   enlarged. Coronary artery atherosclerotic calcifications. Calcifications   of the aortic valve leaflets.    LIVER: Within normal limits.  BILE DUCTS: Normal caliber.  GALLBLADDER: Within normal limits.  SPLEEN: Within normal limits.  PANCREAS: Within normal limits.  ADRENALS: Within normal limits.  KIDNEYS/URETERS: Kidneys enhance symmetrically without hydronephrosis.   Subcentimeter low-attenuation lesions in the right kidney which are too   small to characterize.    BLADDER: Within normal limits.  REPRODUCTIVE ORGANS: Vaginal pessary device in place. Uterus and adnexa   are unremarkable.    BOWEL: No bowel obstruction. Colonic diverticulosis with short segment   thickening of the proximal sigmoid colon with mild surrounding   inflammatory change about an inflamed diverticulum.  PERITONEUM: No ascites, pneumoperitoneum, or loculated collection. No   mesenteric lymphadenopathy.  VESSELS: Atherosclerotic calcifications of the aortoiliac tree. Normal   caliber abdominal aorta.  RETROPERITONEUM/LYMPH NODES: No lymphadenopathy.  ABDOMINAL WALL: Within normal limits.  BONES: Degenerative changes of the spine. 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    IMPRESSION:  Acute uncomplicated proximal sigmoid diverticulitis.    --- End of Report ---      ADRYAN WALSH DO; Attending Radiologist  This document has been electronically signed. 2022  1:38AM  < end of copied text >

## 2022-07-25 NOTE — PATIENT PROFILE ADULT - NSPRESCRALCSCORE_GEN_A_NUR_CAL
pt sent to ED for abnormal blood work.  pt went to her PMD for sick visit, dizziness, weakness and was found to have low H&H
2

## 2022-07-25 NOTE — CHART NOTE - NSCHARTNOTEFT_GEN_A_CORE
Patient admitted early this AM, please see admission H+P for more detail.  Briefly, patient is a 89 yo female with PMH of chronic back pain (Tramadol, Celebrex at home), s/p hernia repair and appendectomy presents to ED with severe, diffuse, and constant abdominal pain for 2 days. Admit for acute sigmoid diverticulitis.    Patient seen and examined.  Labs, vitals, orders, notes reviewed.      A/P:  Diverticulitis:   -Continue IV Zosyn  -IV fluids  -Advanced to clears  -Pain improving, continue to monitor with serial abdominal exams, daily labs.  -No surgical intervention indicated at this time.  -GI/Surgery following.

## 2022-07-25 NOTE — CONSULT NOTE ADULT - SUBJECTIVE AND OBJECTIVE BOX
[facility]  Rufino Arroyo PA-C  [address] [place] [id number] [phone]      Chief Complaint:  Patient is a 88y old  Female who presents with a chief complaint of abdominal pain (2022 03:35)      HPI:89 yo female with PMH of chronic back pain (Tramadol, Celebrex at home), s/p hernia repair and appendectomy presents to ED with severe, diffuse, and constant abdominal pain for 2 days. Pain began on morning of  after having a meal, which continued to increase in severity. By , patient was unable to sleep through the prior evening and only drank water, skipping all meals. Patient takes Tramadol for chronic back pain and is known to have some constipation at baseline. Stomach pain was initially thought to be secondary to constipation and Miralax was given which did not improve symptoms. Son insisted that the patient be brought to the hospital as symptoms were worsening. Patient reports continued abdominal pain and chronic constipation. Never had diverticulitis before. No bloody BMs. States that she has occasional SOB but none recently or at present. Denies fevers, chills, chest pain, palpitations, dyspnea, headache, n/v/d.     Allergies:  No Known Allergies      Medications:  acetaminophen     Tablet .. 650 milliGRAM(s) Oral every 6 hours PRN  aluminum hydroxide/magnesium hydroxide/simethicone Suspension 30 milliLiter(s) Oral every 4 hours PRN  celecoxib 100 milliGRAM(s) Oral daily  enoxaparin Injectable 40 milliGRAM(s) SubCutaneous every 24 hours  lactated ringers. 1000 milliLiter(s) IV Continuous <Continuous>  lidocaine   4% Patch 1 Patch Transdermal every 24 hours PRN  melatonin 3 milliGRAM(s) Oral at bedtime PRN  ondansetron Injectable 4 milliGRAM(s) IV Push every 8 hours PRN  piperacillin/tazobactam IVPB.. 3.375 Gram(s) IV Intermittent every 8 hours  traMADol 50 milliGRAM(s) Oral every 6 hours PRN      PMHX/PSHX:  Chronic back pain    Scoliosis    S/P hernia repair    S/P appendectomy        Family history:  No pertinent family history in first degree relatives        Social History:     ROS:     General:  No wt loss, fevers, chills, night sweats, fatigue,   Eyes:  Good vision, no reported pain  ENT:  No sore throat, pain, runny nose, dysphagia  CV:  No pain, palpitations, hypo/hypertension  Resp:  No dyspnea, cough, tachypnea, wheezing  GI:  No pain, No nausea, No vomiting, No diarrhea, No constipation, No weight loss, No fever, No pruritis, No rectal bleeding, No tarry stools, No dysphagia,  :  No pain, bleeding, incontinence, nocturia  Muscle:  No pain, weakness  Neuro:  No weakness, tingling, memory problems  Psych:  No fatigue, insomnia, mood problems, depression  Endocrine:  No polyuria, polydipsia, cold/heat intolerance  Heme:  No petechiae, ecchymosis, easy bruisability  Skin:  No rash, tattoos, scars, edema      PHYSICAL EXAM:   Vital Signs:  Vital Signs Last 24 Hrs  T(C): 36.8 (2022 07:25), Max: 36.8 (2022 07:25)  T(F): 98.3 (2022 07:25), Max: 98.3 (2022 07:25)  HR: 63 (2022 07:25) (63 - 76)  BP: 113/58 (2022 07:25) (111/61 - 137/63)  BP(mean): --  RR: 18 (2022 07:25) (16 - 18)  SpO2: 97% (2022 07:25) (96% - 99%)    Parameters below as of 2022 05:05  Patient On (Oxygen Delivery Method): room air      Daily Height in cm: 154.94 (2022 21:36)    Daily     GENERAL:  Appears stated age,   HEENT:  NC/AT,    CHEST:  Full & symmetric excursion,   HEART:  Regular rhythm  ABDOMEN:  Soft, non-tender, non-distended,   EXTEREMITIES:  no cyanosis,clubbing or edema  SKIN:  No rash  NEURO:  Alert,    LABS:                        10.5   7.86  )-----------( 196      ( 2022 05:35 )             32.0     07-25    142  |  109<H>  |  16  ----------------------------<  98  3.8   |  27  |  0.53    Ca    8.1<L>      2022 05:35    TPro  6.4  /  Alb  2.7<L>  /  TBili  0.7  /  DBili  x   /  AST  14<L>  /  ALT  7<L>  /  AlkPhos  44  07-25    LIVER FUNCTIONS - ( 2022 05:35 )  Alb: 2.7 g/dL / Pro: 6.4 g/dL / ALK PHOS: 44 U/L / ALT: 7 U/L / AST: 14 U/L / GGT: x           PT/INR - ( 2022 23:24 )   PT: 14.5 sec;   INR: 1.24 ratio         PTT - ( 2022 23:24 )  PTT:25.4 sec  Urinalysis Basic - ( 2022 00:46 )    Color: Yellow / Appearance: Slightly Turbid / S.005 / pH: x  Gluc: x / Ketone: Negative  / Bili: Negative / Urobili: Negative   Blood: x / Protein: Negative / Nitrite: Negative   Leuk Esterase: Moderate / RBC: 3-5 /HPF / WBC >50   Sq Epi: x / Non Sq Epi: Few / Bacteria: Occasional      Amylase Serum--      Lipase [id number]       Ammonia--      Imaging:

## 2022-07-25 NOTE — H&P ADULT - PROBLEM SELECTOR PLAN 3
PMH chronic back pain with existing scoliosis  - Continue home Tramadol and Celebrex for pain  - Tylenol, lidocaine patch PRN for pain PMH chronic back pain   - Continue home Tramadol and Celebrex for pain  - Tylenol, lidocaine patch PRN for pain

## 2022-07-25 NOTE — H&P ADULT - PROBLEM SELECTOR PLAN 2
UA showed moderate leukocyte esterase, >50 WBC, occasional bacteria. Patient afebrile and denies urinary symptoms on examination  - f/u blood, urine cx UA showed moderate leukocyte esterase, >50 WBC, occasional bacteria. Patient afebrile and denies urinary symptoms on examination  - will be on abx for diverticulitis - will cover UTI if present  - f/u blood, urine cx

## 2022-07-25 NOTE — H&P ADULT - NSHPREVIEWOFSYSTEMS_GEN_ALL_CORE
CONSTITUTIONAL: denies fever, chills, fatigue, weakness  HEENT: denies blurred vision, sore throat  SKIN: denies new lesions, rash  CARDIOVASCULAR: denies chest pain, chest pressure, palpitations  RESPIRATORY: +intermittent shortness of breath. denies sputum production  GASTROINTESTINAL: +constipation, loss of appetite, abdominal pain. denies nausea, vomiting, diarrhea  GENITOURINARY: denies dysuria, discharge  NEUROLOGICAL: +dizziness. denies numbness, headache, focal weakness  MUSCULOSKELETAL: +B/L leg weakness. denies new joint pain, muscle aches  HEMATOLOGIC: denies gross bleeding, bruising  LYMPHATICS: denies enlarged lymph nodes, extremity swelling  PSYCHIATRIC: denies recent changes in anxiety, depression  ENDOCRINOLOGIC: denies sweating, cold or heat intolerance

## 2022-07-25 NOTE — H&P ADULT - ASSESSMENT
87 yo female with PMH of chronic back pain presents to ED with severe, diffuse, constant abdominal pain 2/2 acute diverticulitis. 87 yo female with PMH of chronic back pain (Tramadol, Celebrex at home), s/p hernia repair and appendectomy presents to ED with severe, diffuse, and constant abdominal pain for 2 days. 87 yo female with PMH of chronic back pain (Tramadol, Celebrex at home), s/p hernia repair and appendectomy presents to ED with severe, diffuse, and constant abdominal pain for 2 days. Admit for acute sigmoid diverticulitis.

## 2022-07-25 NOTE — H&P ADULT - NSHPSOCIALHISTORY_GEN_ALL_CORE
Tobacco: Denies  EtOH: 1 drink every 2 weeks, only on social occasions  Recreational drug use: Denies  Lives with: Self at home  Ambulates: Mainly unassisted but uses cane/walker for traveling long distances  ADLs: able to cook, clean, and shop independently

## 2022-07-25 NOTE — H&P ADULT - PROBLEM SELECTOR PLAN 1
Patient presented to ED with severe abdominal pain, CT abd showed acute uncomplicated proximal sigmoid diverticulitis. WBC 10.5, lactate normal  - IV Zosyn  - NPO except meds, IV fluids  - f/u AM labs  - Trend WBC curves  - Zofran PRN for nausea/vomiting  - COVID negative  - Surgery consulted: Serial abdominal exams. No indication for acute surgical intervention at this time. Continue f/u recs Patient presented to ED with severe abdominal pain, CT abd showed acute uncomplicated proximal sigmoid diverticulitis. WBC 10.5, lactate normal  - IV Zosyn  - NPO except meds, IV fluids  - f/u AM labs  - WBC elevated (10.5). Trend WBC curves  - Zofran PRN for nausea/vomiting  - COVID negative  - Surgery consulted: Serial abdominal exams. No indication for acute surgical intervention at this time. Continue f/u recs Patient presented to ED with severe abdominal pain, CT abd showed acute uncomplicated proximal sigmoid diverticulitis. WBC 10.5, lactate normal  - IV Zosyn  - NPO except meds, IV fluids, pain control PRN  - f/u AM labs  - WBC elevated (10.5). Trend WBC curves  - Zofran PRN for nausea/vomiting  - Surgery consulted: Serial abdominal exams. No indication for acute surgical intervention at this time.

## 2022-07-25 NOTE — H&P ADULT - HISTORY OF PRESENT ILLNESS
Patient's primary language is [place]; patient agreed to having history be obtained from son.    89 yo female with PMH of chronic back pain presents to ED with severe, diffuse, constant abdominal pain. Pain began on morning of 7/22 after having a meal, which the patient dismissed. Pain continued to increase throughout the weekend and by 7/24, patient was unable to sleep through the night and only drank water, skipping all meals. Patient takes Tramadol for chronic back pain and is known to have some constipation at baseline. Stomach pain was initially thought to be secondary to constipation and Miralax was given which did not improve symptoms. Son then insisted that the patient be brought to the hospital as symptoms were worsening.    in ED:  VS: /63, HR 76, 98.1F, RR 16, SpO2 96% on RA   Labs significant for: WBC 10.51, H/h 11.3/34.5  CXR wet read: clear lungs b/l  CT abd/pelvis:   Received: 1L NS, lupillo, zosyn in ED   Patient's primary language is Serbian; patient agreed to having history be obtained from son.    87 yo female with PMH of chronic back pain (Tramadol, Celebrex at home), s/p hernia repair and appendectomy presents to ED with severe, diffuse, and constant abdominal pain for 2 days. Pain began on morning of 7/22 after having a meal, which continued to increase in severity. By 7/24, patient was unable to sleep through the night and only drank water, skipping all meals. Patient takes Tramadol for chronic back pain and is known to have some constipation at baseline. Stomach pain was initially thought to be secondary to constipation and Miralax was given which did not improve symptoms. Son insisted that the patient be brought to the hospital as symptoms were worsening.    in ED:  VS: /63, HR 76, 98.1F, RR 16, SpO2 96% on RA   Labs significant for: WBC 10.51, H/h 11.3/34.5  EKG: Sinus rhythm with premature atrial complexes, nonspecific T wave abnormality, HR 66 bpm  CXR wet read: clear lungs b/l  CT abd/pelvis: acute uncomplicated proximal sigmoid diverticulitis  Received: 1L NS, ofirmev, zosyn in ED   Patient's primary language is [place]olan; patient agreed to having history be obtained from son.    87 yo female with PMH of chronic back pain (Tramadol, Celebrex at home), s/p hernia repair and appendectomy presents to ED with severe, diffuse, and constant abdominal pain for 2 days. Pain began on morning of 7/22 after having a meal, which continued to increase in severity. By 7/24, patient was unable to sleep through the prior evening and only drank water, skipping all meals. Patient takes Tramadol for chronic back pain and is known to have some constipation at baseline. Stomach pain was initially thought to be secondary to constipation and Miralax was given which did not improve symptoms. Son insisted that the patient be brought to the hospital as symptoms were worsening.    in ED:  VS: /63, HR 76, 98.1F, RR 16, SpO2 96% on RA   Labs significant for: WBC 10.51, H/h 11.3/34.5  EKG: Sinus rhythm with premature atrial complexes, nonspecific T wave abnormality, HR 66 bpm  CXR wet read: clear lungs b/l  CT abd/pelvis: acute uncomplicated proximal sigmoid diverticulitis  Received: 1L NS, ofirmev, zosyn in ED   89 yo female with PMH of chronic back pain (Tramadol, Celebrex at home), s/p hernia repair and appendectomy presents to ED with severe, diffuse, and constant abdominal pain for 2 days. Pain began on morning of 7/22 after having a meal, which continued to increase in severity. By 7/24, patient was unable to sleep through the prior evening and only drank water, skipping all meals. Patient takes Tramadol for chronic back pain and is known to have some constipation at baseline. Stomach pain was initially thought to be secondary to constipation and Miralax was given which did not improve symptoms. Son insisted that the patient be brought to the hospital as symptoms were worsening. Patient reports continued abdominal pain and chronic constipation. Never had diverticulitis before. No bloody BMs. States that she has occasional SOB but none recently or at present. Denies fevers, chills, chest pain, palpitations, dyspnea, headache, n/v/d.     In the ED:  VS: /63, HR 76, 98.1F, RR 16, SpO2 96% on RA   Labs significant for: WBC 10.51, H/h 11.3/34.5  EKG: Sinus rhythm with premature atrial complexes, nonspecific T wave abnormality, HR 66 bpm  CXR wet read: no acute infiltrates, fibrotic appearing lungs  CT abd/pelvis: acute uncomplicated proximal sigmoid diverticulitis  Received: 1L NS, franco wesley in ED

## 2022-07-25 NOTE — CONSULT NOTE ADULT - ASSESSMENT
diverticulitis  abdominal pain  abnormal CT     uncomplicated diverticulitis on CT  clinically improved  iv fluid  pain control prn  cont iv zosyn  advance to clears  rec outpatient colonoscopy in 8 weeks; patient will consider  d/w patient    I reviewed the overnight course of events on the unit, re-confirming the patient history. I discussed the care with the patient and their family  The plan of care was discussed with the physician assistant and modifications were made to the notation where appropriate.   Differential diagnosis and plan of care discussed with patient after the evaluation  35 minutes spent on total encounter of which more than fifty percent of the encounter was spent counseling and/or coordinating care by the attending physician.  Advanced care planning was discussed with patient and family.  Advanced care planning forms were reviewed and discussed.  Risks, benefits and alternatives of gastroenterologic procedures were discussed in detail and all questions were answered.

## 2022-07-25 NOTE — CONSULT NOTE ADULT - PROBLEM SELECTOR RECOMMENDATION 9
Care as per primary team  Pain control, supportive care, OOB with assistance  IV Zosyn  NPO, IV fluids  Serial abdominal exams  F/U AM labs  No indication for acute surgical intervention at this time  Will discuss with Dr. Moe

## 2022-07-25 NOTE — H&P ADULT - DOES THIS PATIENT HAVE A HISTORY OF OR HAS BEEN DX WITH HEART FAILURE?
no unknown O-T Plasty Text: The defect edges were debeveled with a #15 scalpel blade.  Given the location of the defect, shape of the defect and the proximity to free margins an O-T plasty was deemed most appropriate.  Using a sterile surgical marker, an appropriate O-T plasty was drawn incorporating the defect and placing the expected incisions within the relaxed skin tension lines where possible.    The area thus outlined was incised deep to adipose tissue with a #15 scalpel blade.  The skin margins were undermined to an appropriate distance in all directions utilizing iris scissors.

## 2022-07-25 NOTE — PATIENT PROFILE ADULT - FALL HARM RISK - HARM RISK INTERVENTIONS

## 2022-07-25 NOTE — H&P ADULT - ATTENDING COMMENTS
89 yo female with PMH of chronic back pain (Tramadol, Celebrex at home), s/p hernia repair and appendectomy presents to ED with severe, diffuse, and constant abdominal pain for 2 days. Admit for acute sigmoid diverticulitis. Continue IV fluids and zosyn. Follow up cultures. Surgery following. GI consultation. Pain control if needed - patient mostly tender to palpation. D/w patient at bedside.    Agree with H&P as outlined above, edited where appropriate.

## 2022-07-25 NOTE — H&P ADULT - NSHPPHYSICALEXAM_GEN_ALL_CORE
T(C): 36.7 (07-24-22 @ 21:36), Max: 36.7 (07-24-22 @ 21:36)  HR: 76 (07-24-22 @ 21:36) (76 - 76)  BP: 137/63 (07-24-22 @ 21:36) (137/63 - 137/63)  RR: 16 (07-24-22 @ 21:36) (16 - 16)  SpO2: 96% (07-24-22 @ 21:36) (96% - 96%)    GENERAL: patient appears well, no acute distress, conversant  EYES: anicteric sclerae, no exudates  ENMT: oropharynx clear without erythema, no exudates, moist mucous membranes  LUNGS: R sided wheeze appreciated on auscultation. No rales, rhonchi  HEART: S1/S2, regular rate and rhythm, no murmurs noted, no lower extremity edema  GASTROINTESTINAL: abdomen is nondistended but firm and diffusely tender to palpation. Diminished bowel sounds, no palpable masses  INTEGUMENT: good skin turgor, warm skin, appears well perfused  MUSCULOSKELETAL: no clubbing or cyanosis, no obvious deformity  NEUROLOGIC: awake, alert, oriented x3, good muscle tone in 4 extremities, no obvious sensory deficits  PSYCHIATRIC: mood is good, affect is congruent, linear and logical thought process  HEME/LYMPH: no obvious ecchymosis or petechiae T(C): 36.7 (07-24-22 @ 21:36), Max: 36.7 (07-24-22 @ 21:36)  HR: 76 (07-24-22 @ 21:36) (76 - 76)  BP: 137/63 (07-24-22 @ 21:36) (137/63 - 137/63)  RR: 16 (07-24-22 @ 21:36) (16 - 16)  SpO2: 96% (07-24-22 @ 21:36) (96% - 96%)    GENERAL: patient appears well, no acute distress, conversant  EYES: anicteric sclerae, no exudates  ENMT: oropharynx clear without erythema, no exudates, moist mucous membranes  LUNGS: R sided wheeze appreciated on auscultation. No rales, rhonchi  HEART: S1/S2, regular rate and rhythm, no murmurs noted, no lower extremity edema  GASTROINTESTINAL: abdomen is nondistended but mildly firm and tender to palpation. Diminished bowel sounds, no palpable masses  INTEGUMENT: good skin turgor, warm skin, appears well perfused  MUSCULOSKELETAL: no clubbing or cyanosis, no obvious deformity  NEUROLOGIC: awake, alert, oriented x3, good muscle tone in 4 extremities, no obvious sensory deficits  PSYCHIATRIC: mood is good, affect is congruent, linear and logical thought process  HEME/LYMPH: no obvious ecchymosis or petechiae

## 2022-07-25 NOTE — CONSULT NOTE ADULT - ASSESSMENT
89 y/o F with PMHx chronic back pain (on Tramadol and Celebrex at home), s/p hernia repair and appendectomy presents with worsening abdominal pain x 2 days, CT scan shows acute uncomplicated sigmoid diverticulitis, WBC 10.5, lactate normal, VSS   89 y/o F with PMHx chronic back pain (on Tramadol and Celebrex at home), s/p hernia repair and appendectomy presents with worsening abdominal pain x 2 days, CT scan shows acute uncomplicated sigmoid diverticulitis, WBC 10.5, lactate normal, VSS      As above in PA notation.  Ms. Livingston personally seen and examined during rounds.  She reports continued, but improved pain.  Vitals non-suggestive since admission.  Abdomen soft without diffuse tenderness.  However, LLQ very tender to gentle palpation.  Labs reassuring with normalization of WBCs since admission and no shift.  CT scan report and images personally reviewed- c/w significant but uncomplicated sigmoid diverticulitis.  Clinically, improving overall.  Surgically, stable for present.  In agreement with current supportive care.  As this is her first episode as per patient and son, would not consider surgery unless there is a clinical deterioration.  However, she should have colonoscopy following resolution of acute issues, since this has never hattie performed.

## 2022-07-26 LAB
ALBUMIN SERPL ELPH-MCNC: 2.7 G/DL — LOW (ref 3.3–5)
ALP SERPL-CCNC: 42 U/L — SIGNIFICANT CHANGE UP (ref 40–120)
ALT FLD-CCNC: 9 U/L — LOW (ref 12–78)
ANION GAP SERPL CALC-SCNC: 5 MMOL/L — SIGNIFICANT CHANGE UP (ref 5–17)
AST SERPL-CCNC: 15 U/L — SIGNIFICANT CHANGE UP (ref 15–37)
BASOPHILS # BLD AUTO: 0.03 K/UL — SIGNIFICANT CHANGE UP (ref 0–0.2)
BASOPHILS NFR BLD AUTO: 0.4 % — SIGNIFICANT CHANGE UP (ref 0–2)
BILIRUB SERPL-MCNC: 0.4 MG/DL — SIGNIFICANT CHANGE UP (ref 0.2–1.2)
BUN SERPL-MCNC: 10 MG/DL — SIGNIFICANT CHANGE UP (ref 7–23)
CALCIUM SERPL-MCNC: 8.4 MG/DL — LOW (ref 8.5–10.1)
CHLORIDE SERPL-SCNC: 107 MMOL/L — SIGNIFICANT CHANGE UP (ref 96–108)
CO2 SERPL-SCNC: 30 MMOL/L — SIGNIFICANT CHANGE UP (ref 22–31)
CREAT SERPL-MCNC: 0.6 MG/DL — SIGNIFICANT CHANGE UP (ref 0.5–1.3)
CULTURE RESULTS: NO GROWTH — SIGNIFICANT CHANGE UP
EGFR: 86 ML/MIN/1.73M2 — SIGNIFICANT CHANGE UP
EOSINOPHIL # BLD AUTO: 0.19 K/UL — SIGNIFICANT CHANGE UP (ref 0–0.5)
EOSINOPHIL NFR BLD AUTO: 2.7 % — SIGNIFICANT CHANGE UP (ref 0–6)
GLUCOSE SERPL-MCNC: 80 MG/DL — SIGNIFICANT CHANGE UP (ref 70–99)
HCT VFR BLD CALC: 32 % — LOW (ref 34.5–45)
HGB BLD-MCNC: 10.2 G/DL — LOW (ref 11.5–15.5)
IMM GRANULOCYTES NFR BLD AUTO: 0.4 % — SIGNIFICANT CHANGE UP (ref 0–1.5)
LYMPHOCYTES # BLD AUTO: 1.87 K/UL — SIGNIFICANT CHANGE UP (ref 1–3.3)
LYMPHOCYTES # BLD AUTO: 27 % — SIGNIFICANT CHANGE UP (ref 13–44)
MCHC RBC-ENTMCNC: 30.8 PG — SIGNIFICANT CHANGE UP (ref 27–34)
MCHC RBC-ENTMCNC: 31.9 GM/DL — LOW (ref 32–36)
MCV RBC AUTO: 96.7 FL — SIGNIFICANT CHANGE UP (ref 80–100)
MONOCYTES # BLD AUTO: 0.75 K/UL — SIGNIFICANT CHANGE UP (ref 0–0.9)
MONOCYTES NFR BLD AUTO: 10.8 % — SIGNIFICANT CHANGE UP (ref 2–14)
NEUTROPHILS # BLD AUTO: 4.06 K/UL — SIGNIFICANT CHANGE UP (ref 1.8–7.4)
NEUTROPHILS NFR BLD AUTO: 58.7 % — SIGNIFICANT CHANGE UP (ref 43–77)
NRBC # BLD: 0 /100 WBCS — SIGNIFICANT CHANGE UP (ref 0–0)
PLATELET # BLD AUTO: 201 K/UL — SIGNIFICANT CHANGE UP (ref 150–400)
POTASSIUM SERPL-MCNC: 3.9 MMOL/L — SIGNIFICANT CHANGE UP (ref 3.5–5.3)
POTASSIUM SERPL-SCNC: 3.9 MMOL/L — SIGNIFICANT CHANGE UP (ref 3.5–5.3)
PROT SERPL-MCNC: 6.2 G/DL — SIGNIFICANT CHANGE UP (ref 6–8.3)
RBC # BLD: 3.31 M/UL — LOW (ref 3.8–5.2)
RBC # FLD: 13.2 % — SIGNIFICANT CHANGE UP (ref 10.3–14.5)
SODIUM SERPL-SCNC: 142 MMOL/L — SIGNIFICANT CHANGE UP (ref 135–145)
SPECIMEN SOURCE: SIGNIFICANT CHANGE UP
WBC # BLD: 6.93 K/UL — SIGNIFICANT CHANGE UP (ref 3.8–10.5)
WBC # FLD AUTO: 6.93 K/UL — SIGNIFICANT CHANGE UP (ref 3.8–10.5)

## 2022-07-26 PROCEDURE — 99232 SBSQ HOSP IP/OBS MODERATE 35: CPT

## 2022-07-26 PROCEDURE — 99233 SBSQ HOSP IP/OBS HIGH 50: CPT

## 2022-07-26 RX ORDER — SENNA PLUS 8.6 MG/1
2 TABLET ORAL AT BEDTIME
Refills: 0 | Status: DISCONTINUED | OUTPATIENT
Start: 2022-07-26 | End: 2022-07-27

## 2022-07-26 RX ADMIN — PIPERACILLIN AND TAZOBACTAM 25 GRAM(S): 4; .5 INJECTION, POWDER, LYOPHILIZED, FOR SOLUTION INTRAVENOUS at 06:22

## 2022-07-26 RX ADMIN — ENOXAPARIN SODIUM 40 MILLIGRAM(S): 100 INJECTION SUBCUTANEOUS at 06:22

## 2022-07-26 RX ADMIN — PIPERACILLIN AND TAZOBACTAM 25 GRAM(S): 4; .5 INJECTION, POWDER, LYOPHILIZED, FOR SOLUTION INTRAVENOUS at 12:40

## 2022-07-26 RX ADMIN — TRAMADOL HYDROCHLORIDE 50 MILLIGRAM(S): 50 TABLET ORAL at 12:41

## 2022-07-26 RX ADMIN — CELECOXIB 100 MILLIGRAM(S): 200 CAPSULE ORAL at 13:15

## 2022-07-26 RX ADMIN — TRAMADOL HYDROCHLORIDE 50 MILLIGRAM(S): 50 TABLET ORAL at 22:20

## 2022-07-26 RX ADMIN — TRAMADOL HYDROCHLORIDE 50 MILLIGRAM(S): 50 TABLET ORAL at 21:28

## 2022-07-26 RX ADMIN — PIPERACILLIN AND TAZOBACTAM 25 GRAM(S): 4; .5 INJECTION, POWDER, LYOPHILIZED, FOR SOLUTION INTRAVENOUS at 18:16

## 2022-07-26 RX ADMIN — Medication 650 MILLIGRAM(S): at 02:17

## 2022-07-26 RX ADMIN — Medication 650 MILLIGRAM(S): at 03:00

## 2022-07-26 RX ADMIN — CELECOXIB 100 MILLIGRAM(S): 200 CAPSULE ORAL at 12:41

## 2022-07-26 RX ADMIN — TRAMADOL HYDROCHLORIDE 50 MILLIGRAM(S): 50 TABLET ORAL at 13:15

## 2022-07-26 RX ADMIN — SENNA PLUS 2 TABLET(S): 8.6 TABLET ORAL at 21:28

## 2022-07-26 NOTE — PROGRESS NOTE ADULT - ASSESSMENT
89 yo female with PMH of chronic back pain (Tramadol, Celebrex at home), s/p hernia repair and appendectomy presents to ED with severe, diffuse, and constant abdominal pain for 2 days. Admit for acute sigmoid diverticulitis.

## 2022-07-26 NOTE — PROGRESS NOTE ADULT - SUBJECTIVE AND OBJECTIVE BOX
INTERVAL HPI/OVERNIGHT EVENTS:    STATUS POST:      POST OPERATIVE DAY #:     SUBJECTIVE:  Flatus: [ ] YES [ ] NO             Bowel Movement: [ ] YES [ ] NO  Pain (0-10):            Pain Control Adequate: [ ] YES [ ] NO  Nausea: [ ] YES [ ] NO            Vomiting: [ ] YES [ ] NO  Diarrhea: [ ] YES [ ] NO         Constipation: [ ] YES [ ] NO     Chest Pain: [ ] YES [ ] NO    SOB:  [ ] YES [ ] NO    MEDICATIONS  (STANDING):  celecoxib 100 milliGRAM(s) Oral daily  enoxaparin Injectable 40 milliGRAM(s) SubCutaneous every 24 hours  piperacillin/tazobactam IVPB.. 3.375 Gram(s) IV Intermittent every 8 hours    MEDICATIONS  (PRN):  acetaminophen     Tablet .. 650 milliGRAM(s) Oral every 6 hours PRN Temp greater or equal to 38C (100.4F), Mild Pain (1 - 3)  aluminum hydroxide/magnesium hydroxide/simethicone Suspension 30 milliLiter(s) Oral every 4 hours PRN Dyspepsia  lidocaine   4% Patch 1 Patch Transdermal every 24 hours PRN pain  melatonin 3 milliGRAM(s) Oral at bedtime PRN Insomnia  ondansetron Injectable 4 milliGRAM(s) IV Push every 8 hours PRN Nausea and/or Vomiting  traMADol 50 milliGRAM(s) Oral every 6 hours PRN Moderate Pain (4 - 6)      Vital Signs Last 24 Hrs  T(C): 36.7 (2022 04:47), Max: 36.9 (2022 18:36)  T(F): 98 (2022 04:47), Max: 98.5 (2022 20:38)  HR: 65 (2022 04:47) (63 - 75)  BP: 108/61 (2022 04:47) (108/61 - 144/66)  BP(mean): --  RR: 16 (2022 04:47) (16 - 18)  SpO2: 94% (2022 04:47) (94% - 97%)    Parameters below as of 2022 04:47  Patient On (Oxygen Delivery Method): room air        PHYSICAL EXAM:      Constitutional: A&Ox3    Breasts:    Respiratory: non labored breathing, no respiratory distress    Cardiovascular: NSR, RRR    Gastrointestinal:                 Incision:    Genitourinary:    Extremities: (-) edema                  I&O's Detail    2022 07:01  -  2022 06:06  --------------------------------------------------------  IN:    IV PiggyBack: 100 mL  Total IN: 100 mL    OUT:  Total OUT: 0 mL    Total NET: 100 mL          LABS:                        10.5   7.86  )-----------( 196      ( 2022 05:35 )             32.0     07-25    142  |  109<H>  |  16  ----------------------------<  98  3.8   |  27  |  0.53    Ca    8.1<L>      2022 05:35    TPro  6.4  /  Alb  2.7<L>  /  TBili  0.7  /  DBili  x   /  AST  14<L>  /  ALT  7<L>  /  AlkPhos  44  07-25    PT/INR - ( 2022 23:24 )   PT: 14.5 sec;   INR: 1.24 ratio         PTT - ( 2022 23:24 )  PTT:25.4 sec  Urinalysis Basic - ( 2022 00:46 )    Color: Yellow / Appearance: Slightly Turbid / S.005 / pH: x  Gluc: x / Ketone: Negative  / Bili: Negative / Urobili: Negative   Blood: x / Protein: Negative / Nitrite: Negative   Leuk Esterase: Moderate / RBC: 3-5 /HPF / WBC >50   Sq Epi: x / Non Sq Epi: Few / Bacteria: Occasional        RADIOLOGY & ADDITIONAL STUDIES: Hospital Day #2    DRE PATEL    SUBJECTIVE:  Patient is doing well this morning, seen at bedside with chief, denies any new complaints. Denies any nausea, vomiting, chest pain, shortness of breath, calf tenderness, fever or chills. Reports that she is feeling much better as compared to yesterday, denies any bowel function. Tolerating clear liquid diet, ambulating as tolerated.    MEDICATIONS  (STANDING):  celecoxib 100 milliGRAM(s) Oral daily  enoxaparin Injectable 40 milliGRAM(s) SubCutaneous every 24 hours  piperacillin/tazobactam IVPB.. 3.375 Gram(s) IV Intermittent every 8 hours    MEDICATIONS  (PRN):  acetaminophen     Tablet .. 650 milliGRAM(s) Oral every 6 hours PRN Temp greater or equal to 38C (100.4F), Mild Pain (1 - 3)  aluminum hydroxide/magnesium hydroxide/simethicone Suspension 30 milliLiter(s) Oral every 4 hours PRN Dyspepsia  lidocaine   4% Patch 1 Patch Transdermal every 24 hours PRN pain  melatonin 3 milliGRAM(s) Oral at bedtime PRN Insomnia  ondansetron Injectable 4 milliGRAM(s) IV Push every 8 hours PRN Nausea and/or Vomiting  traMADol 50 milliGRAM(s) Oral every 6 hours PRN Moderate Pain (4 - 6)      Vital Signs Last 24 Hrs  T(C): 36.7 (2022 04:47), Max: 36.9 (2022 18:36)  T(F): 98 (2022 04:47), Max: 98.5 (2022 20:38)  HR: 65 (2022 04:47) (63 - 75)  BP: 108/61 (2022 04:47) (108/61 - 144/66)  BP(mean): --  RR: 16 (2022 04:47) (16 - 18)  SpO2: 94% (2022 04:47) (94% - 97%)    Parameters below as of 2022 04:47  Patient On (Oxygen Delivery Method): room air        PHYSICAL EXAM:  Constitutional: AAOx3, no acute distress  HEENT: NCAT, airway patent  Cardiovascular: RRR, pulses present bilaterally  Respiratory: nonlabored breathing  Gastrointestinal: abdomen soft, tender to light palpation in LLQ, non distended, no rebound or guarding, no palpable masses   Neuro: no focal deficits     I&O's Detail    2022 07:01  -  2022 06:06  --------------------------------------------------------  IN:    IV PiggyBack: 100 mL  Total IN: 100 mL    OUT:  Total OUT: 0 mL    Total NET: 100 mL          LABS:                        10.5   7.86  )-----------( 196      ( 2022 05:35 )             32.0     07-25    142  |  109<H>  |  16  ----------------------------<  98  3.8   |  27  |  0.53    Ca    8.1<L>      2022 05:35    TPro  6.4  /  Alb  2.7<L>  /  TBili  0.7  /  DBili  x   /  AST  14<L>  /  ALT  7<L>  /  AlkPhos  44  07-25    PT/INR - ( 2022 23:24 )   PT: 14.5 sec;   INR: 1.24 ratio         PTT - ( 2022 23:24 )  PTT:25.4 sec  Urinalysis Basic - ( 2022 00:46 )    Color: Yellow / Appearance: Slightly Turbid / S.005 / pH: x  Gluc: x / Ketone: Negative  / Bili: Negative / Urobili: Negative   Blood: x / Protein: Negative / Nitrite: Negative   Leuk Esterase: Moderate / RBC: 3-5 /HPF / WBC >50   Sq Epi: x / Non Sq Epi: Few / Bacteria: Occasional        RADIOLOGY & ADDITIONAL STUDIES: Hospital Day #2    DRE PATEL    SUBJECTIVE:  Patient is doing well this morning, seen at bedside with chief, denies any new complaints. Denies any nausea, vomiting, chest pain, shortness of breath, calf tenderness, fever or chills. Reports that she is feeling much better as compared to yesterday, denies any bowel function. On a clear liquid diet.    MEDICATIONS  (STANDING):  celecoxib 100 milliGRAM(s) Oral daily  enoxaparin Injectable 40 milliGRAM(s) SubCutaneous every 24 hours  piperacillin/tazobactam IVPB.. 3.375 Gram(s) IV Intermittent every 8 hours    MEDICATIONS  (PRN):  acetaminophen     Tablet .. 650 milliGRAM(s) Oral every 6 hours PRN Temp greater or equal to 38C (100.4F), Mild Pain (1 - 3)  aluminum hydroxide/magnesium hydroxide/simethicone Suspension 30 milliLiter(s) Oral every 4 hours PRN Dyspepsia  lidocaine   4% Patch 1 Patch Transdermal every 24 hours PRN pain  melatonin 3 milliGRAM(s) Oral at bedtime PRN Insomnia  ondansetron Injectable 4 milliGRAM(s) IV Push every 8 hours PRN Nausea and/or Vomiting  traMADol 50 milliGRAM(s) Oral every 6 hours PRN Moderate Pain (4 - 6)      Vital Signs Last 24 Hrs  T(C): 36.7 (2022 04:47), Max: 36.9 (2022 18:36)  T(F): 98 (2022 04:47), Max: 98.5 (2022 20:38)  HR: 65 (2022 04:47) (63 - 75)  BP: 108/61 (2022 04:47) (108/61 - 144/66)  BP(mean): --  RR: 16 (2022 04:47) (16 - 18)  SpO2: 94% (2022 04:47) (94% - 97%)    Parameters below as of 2022 04:47  Patient On (Oxygen Delivery Method): room air        PHYSICAL EXAM:  Constitutional: AAOx3, no acute distress  HEENT: NCAT, airway patent  Cardiovascular: RRR, pulses present bilaterally  Respiratory: nonlabored breathing  Gastrointestinal: abdomen soft, tender to light palpation in LLQ, non distended, no rebound or guarding, no palpable masses   Neuro: no focal deficits     I&O's Detail    2022 07:01  -  2022 06:06  --------------------------------------------------------  IN:    IV PiggyBack: 100 mL  Total IN: 100 mL    OUT:  Total OUT: 0 mL    Total NET: 100 mL          LABS:                        10.5   7.86  )-----------( 196      ( 2022 05:35 )             32.0     07-25    142  |  109<H>  |  16  ----------------------------<  98  3.8   |  27  |  0.53    Ca    8.1<L>      2022 05:35    TPro  6.4  /  Alb  2.7<L>  /  TBili  0.7  /  DBili  x   /  AST  14<L>  /  ALT  7<L>  /  AlkPhos  44  07-25    PT/INR - ( 2022 23:24 )   PT: 14.5 sec;   INR: 1.24 ratio         PTT - ( 2022 23:24 )  PTT:25.4 sec  Urinalysis Basic - ( 2022 00:46 )    Color: Yellow / Appearance: Slightly Turbid / S.005 / pH: x  Gluc: x / Ketone: Negative  / Bili: Negative / Urobili: Negative   Blood: x / Protein: Negative / Nitrite: Negative   Leuk Esterase: Moderate / RBC: 3-5 /HPF / WBC >50   Sq Epi: x / Non Sq Epi: Few / Bacteria: Occasional        RADIOLOGY & ADDITIONAL STUDIES: Hospital Day #2    DRE PATEL    SUBJECTIVE:  Patient is doing well this morning, seen at bedside with chief, denies any new complaints. Denies any nausea, vomiting, chest pain, shortness of breath, calf tenderness, fever or chills. Reports that she is feeling much better as compared to yesterday, denies any bowel function. On a clear liquid diet, tolerating     MEDICATIONS  (STANDING):  celecoxib 100 milliGRAM(s) Oral daily  enoxaparin Injectable 40 milliGRAM(s) SubCutaneous every 24 hours  piperacillin/tazobactam IVPB.. 3.375 Gram(s) IV Intermittent every 8 hours    MEDICATIONS  (PRN):  acetaminophen     Tablet .. 650 milliGRAM(s) Oral every 6 hours PRN Temp greater or equal to 38C (100.4F), Mild Pain (1 - 3)  aluminum hydroxide/magnesium hydroxide/simethicone Suspension 30 milliLiter(s) Oral every 4 hours PRN Dyspepsia  lidocaine   4% Patch 1 Patch Transdermal every 24 hours PRN pain  melatonin 3 milliGRAM(s) Oral at bedtime PRN Insomnia  ondansetron Injectable 4 milliGRAM(s) IV Push every 8 hours PRN Nausea and/or Vomiting  traMADol 50 milliGRAM(s) Oral every 6 hours PRN Moderate Pain (4 - 6)      Vital Signs Last 24 Hrs  T(C): 36.7 (2022 04:47), Max: 36.9 (2022 18:36)  T(F): 98 (2022 04:47), Max: 98.5 (2022 20:38)  HR: 65 (2022 04:47) (63 - 75)  BP: 108/61 (2022 04:47) (108/61 - 144/66)  BP(mean): --  RR: 16 (2022 04:47) (16 - 18)  SpO2: 94% (2022 04:47) (94% - 97%)    Parameters below as of 2022 04:47  Patient On (Oxygen Delivery Method): room air        PHYSICAL EXAM:  Constitutional: AAOx3, no acute distress  HEENT: NCAT, airway patent  Cardiovascular: RRR, pulses present bilaterally  Respiratory: nonlabored breathing  Gastrointestinal: abdomen soft, tender to light palpation in LLQ, non distended, no rebound or guarding, no palpable masses   Neuro: no focal deficits     I&O's Detail    2022 07:01  -  2022 06:06  --------------------------------------------------------  IN:    IV PiggyBack: 100 mL  Total IN: 100 mL    OUT:  Total OUT: 0 mL    Total NET: 100 mL          LABS:                        10.5   7.86  )-----------( 196      ( 2022 05:35 )             32.0     07-25    142  |  109<H>  |  16  ----------------------------<  98  3.8   |  27  |  0.53    Ca    8.1<L>      2022 05:35    TPro  6.4  /  Alb  2.7<L>  /  TBili  0.7  /  DBili  x   /  AST  14<L>  /  ALT  7<L>  /  AlkPhos  44  07-25    PT/INR - ( 2022 23:24 )   PT: 14.5 sec;   INR: 1.24 ratio         PTT - ( 2022 23:24 )  PTT:25.4 sec  Urinalysis Basic - ( 2022 00:46 )    Color: Yellow / Appearance: Slightly Turbid / S.005 / pH: x  Gluc: x / Ketone: Negative  / Bili: Negative / Urobili: Negative   Blood: x / Protein: Negative / Nitrite: Negative   Leuk Esterase: Moderate / RBC: 3-5 /HPF / WBC >50   Sq Epi: x / Non Sq Epi: Few / Bacteria: Occasional        RADIOLOGY & ADDITIONAL STUDIES: Hospital Day #2      DRE PATEL      SUBJECTIVE:  Patient is doing well this morning, seen at bedside with chief, denies any new complaints. Denies any nausea, vomiting, chest pain, shortness of breath, calf tenderness, fever or chills. Reports that she is feeling much better as compared to yesterday, denies any bowel function. On a clear liquid diet, tolerating       MEDICATIONS  (STANDING):  celecoxib 100 milliGRAM(s) Oral daily  enoxaparin Injectable 40 milliGRAM(s) SubCutaneous every 24 hours  piperacillin/tazobactam IVPB.. 3.375 Gram(s) IV Intermittent every 8 hours      MEDICATIONS  (PRN):  acetaminophen     Tablet .. 650 milliGRAM(s) Oral every 6 hours PRN Temp greater or equal to 38C (100.4F), Mild Pain (1 - 3)  aluminum hydroxide/magnesium hydroxide/simethicone Suspension 30 milliLiter(s) Oral every 4 hours PRN Dyspepsia  lidocaine   4% Patch 1 Patch Transdermal every 24 hours PRN pain  melatonin 3 milliGRAM(s) Oral at bedtime PRN Insomnia  ondansetron Injectable 4 milliGRAM(s) IV Push every 8 hours PRN Nausea and/or Vomiting  traMADol 50 milliGRAM(s) Oral every 6 hours PRN Moderate Pain (4 - 6)      Vital Signs Last 24 Hrs  T(C): 36.7 (2022 04:47), Max: 36.9 (2022 18:36)  T(F): 98 (2022 04:47), Max: 98.5 (2022 20:38)  HR: 65 (2022 04:47) (63 - 75)  BP: 108/61 (2022 04:47) (108/61 - 144/66)  RR: 16 (2022 04:47) (16 - 18)  SpO2: 94% (2022 04:47) (94% - 97%)      Parameters below as of 2022 04:47  Patient On (Oxygen Delivery Method): room air      PHYSICAL EXAM:  Constitutional: AAOx3, no acute distress  HEENT: NCAT, airway patent  Cardiovascular: RRR, pulses present bilaterally  Respiratory: nonlabored breathing  Gastrointestinal: abdomen soft, tender to light palpation in LLQ, non distended, no rebound or guarding, no palpable masses   Neuro: no focal deficits       I&O's Detail    2022 07:01  -  2022 06:06  --------------------------------------------------------  IN:    IV PiggyBack: 100 mL  Total IN: 100 mL    OUT:  Total OUT: 0 mL    Total NET: 100 mL      LABS:                        10.5   7.86  )-----------( 196      ( 2022 05:35 )             32.0     07-25    142  |  109<H>  |  16  ----------------------------<  98  3.8   |  27  |  0.53    Ca    8.1<L>      2022 05:35    TPro  6.4  /  Alb  2.7<L>  /  TBili  0.7  /  DBili  x   /  AST  14<L>  /  ALT  7<L>  /  AlkPhos  44  07-25    PT/INR - ( 2022 23:24 )   PT: 14.5 sec;   INR: 1.24 ratio    PTT - ( 2022 23:24 )  PTT:25.4 sec    Urinalysis Basic - ( 2022 00:46 )  Color: Yellow / Appearance: Slightly Turbid / S.005 / pH: x  Gluc: x / Ketone: Negative  / Bili: Negative / Urobili: Negative   Blood: x / Protein: Negative / Nitrite: Negative   Leuk Esterase: Moderate / RBC: 3-5 /HPF / WBC >50   Sq Epi: x / Non Sq Epi: Few / Bacteria: Occasional      RADIOLOGY & ADDITIONAL STUDIES:  No new imaging.

## 2022-07-26 NOTE — PROGRESS NOTE ADULT - PROBLEM SELECTOR PLAN 3
PMH chronic back pain   - Continue home Tramadol and Celebrex for pain  - Tylenol, lidocaine patch PRN for pain

## 2022-07-26 NOTE — PROGRESS NOTE ADULT - PROBLEM SELECTOR PLAN 1
-continue IV abx, no surgical intervention at this time   -pain and nausea management  -NPO/IVF  -serial abdominal exams  -f/u AM labs   -supportive care/ooba  -care as per primary team  -Discuss with Dr. Moe -continue IV abx, no surgical intervention at this time   -pain and nausea management  -CLD  -serial abdominal exams  -f/u AM labs   -supportive care/ooba  -care as per primary team  -Discuss with Dr. Moe -continue IV abx, no surgical intervention at this time   -pain and nausea management  -CLD- advance as tolerated   -serial abdominal exams  -f/u AM labs   -supportive care/ooba  -care as per primary team  -Discuss with Dr. Moe

## 2022-07-26 NOTE — PROGRESS NOTE ADULT - TIME BILLING
Reviewed with patient in detail, family at bedside, Hospitalist attending, Surgery PA and today's RN.

## 2022-07-26 NOTE — PROGRESS NOTE ADULT - SUBJECTIVE AND OBJECTIVE BOX
Patient is a 88y old  Female who presents with a chief complaint of abdominal pain (2022 06:05)      INTERVAL HPI/OVERNIGHT EVENTS:  Patient seen and examined. Doing much better today. She reports her pain is much better. No nausea, vomiting, diarrhea. No chest pain, SOB, palpitations.     MEDICATIONS  (STANDING):  celecoxib 100 milliGRAM(s) Oral daily  enoxaparin Injectable 40 milliGRAM(s) SubCutaneous every 24 hours  piperacillin/tazobactam IVPB.. 3.375 Gram(s) IV Intermittent every 8 hours    MEDICATIONS  (PRN):  acetaminophen     Tablet .. 650 milliGRAM(s) Oral every 6 hours PRN Temp greater or equal to 38C (100.4F), Mild Pain (1 - 3)  aluminum hydroxide/magnesium hydroxide/simethicone Suspension 30 milliLiter(s) Oral every 4 hours PRN Dyspepsia  lidocaine   4% Patch 1 Patch Transdermal every 24 hours PRN pain  melatonin 3 milliGRAM(s) Oral at bedtime PRN Insomnia  ondansetron Injectable 4 milliGRAM(s) IV Push every 8 hours PRN Nausea and/or Vomiting  traMADol 50 milliGRAM(s) Oral every 6 hours PRN Moderate Pain (4 - 6)      Allergies    No Known Allergies    Intolerances        REVIEW OF SYSTEMS:  as per HPI    Vital Signs Last 24 Hrs  T(C): 36.7 (2022 04:47), Max: 36.9 (2022 18:36)  T(F): 98 (2022 04:47), Max: 98.5 (2022 20:38)  HR: 65 (2022 04:47) (65 - 75)  BP: 108/61 (2022 04:47) (108/61 - 144/66)  BP(mean): --  RR: 16 (2022 04:47) (16 - 18)  SpO2: 94% (2022 04:47) (94% - 97%)    Parameters below as of 2022 04:47  Patient On (Oxygen Delivery Method): room air        PHYSICAL EXAM:  GENERAL: NAD  HEENT:  anicteric, moist mucous membranes  CHEST/LUNG:  CTA b/l, no rales, wheezes, or rhonchi  HEART:  RRR, S1, S2  ABDOMEN:  BS+, soft, nontender, nondistended  EXTREMITIES: no edema, cyanosis, or calf tenderness  NERVOUS SYSTEM: answers questions and follows commands appropriately    LABS:                        10.2   6.93  )-----------( 201      ( 2022 05:58 )             32.0     CBC Full  -  ( 2022 05:58 )  WBC Count : 6.93 K/uL  Hemoglobin : 10.2 g/dL  Hematocrit : 32.0 %  Platelet Count - Automated : 201 K/uL  Mean Cell Volume : 96.7 fl  Mean Cell Hemoglobin : 30.8 pg  Mean Cell Hemoglobin Concentration : 31.9 gm/dL  Auto Neutrophil # : 4.06 K/uL  Auto Lymphocyte # : 1.87 K/uL  Auto Monocyte # : 0.75 K/uL  Auto Eosinophil # : 0.19 K/uL  Auto Basophil # : 0.03 K/uL  Auto Neutrophil % : 58.7 %  Auto Lymphocyte % : 27.0 %  Auto Monocyte % : 10.8 %  Auto Eosinophil % : 2.7 %  Auto Basophil % : 0.4 %    2022 07:38    142    |  107    |  10     ----------------------------<  80     3.9     |  30     |  0.60     Ca    8.4        2022 07:38    TPro  6.2    /  Alb  2.7    /  TBili  0.4    /  DBili  x      /  AST  15     /  ALT  9      /  AlkPhos  42     2022 07:38    PT/INR - ( 2022 23:24 )   PT: 14.5 sec;   INR: 1.24 ratio         PTT - ( 2022 23:24 )  PTT:25.4 sec  Urinalysis Basic - ( 2022 00:46 )    Color: Yellow / Appearance: Slightly Turbid / S.005 / pH: x  Gluc: x / Ketone: Negative  / Bili: Negative / Urobili: Negative   Blood: x / Protein: Negative / Nitrite: Negative   Leuk Esterase: Moderate / RBC: 3-5 /HPF / WBC >50   Sq Epi: x / Non Sq Epi: Few / Bacteria: Occasional      CAPILLARY BLOOD GLUCOSE              RADIOLOGY & ADDITIONAL TESTS:    Personally reviewed.     Consultant(s) Notes Reviewed:  [x] YES  [ ] NO

## 2022-07-26 NOTE — PROGRESS NOTE ADULT - ASSESSMENT
87 yo F w pmh of chronic back pain, s/p hernia repair and appendectomy presented with worsening abdominal pain for two days, CT scan c/w uncomplicated sigmoid diverticulitis  89 yo F w pmh of chronic back pain, s/p hernia repair and appendectomy presented with worsening abdominal pain for two days, CT scan c/w uncomplicated sigmoid diverticulitis       As above in PA notation.  Ms. Crisostomo personally seen and examined during early PM rounds.  She reports minimal discomfort except for her back and is passing flatus.  No issues with clear liquids yesterday and started on low fiber today.  Vitals non-suggestive last 24 hours.  Abdomen soft, non tense, mild tenderness only to deep palpation of LLQ without guarding or rebound or referred pain.  Labs reassuring today.  Clinically, improving overall.  Surgically, stable at present.  To continue current supportive care.  Starting on stool softener.  Would recommend 24 hours of low residue diet, 48 hours of IV ABX prior to consideration of discharge.  Will need outpatient GI follow-up for further education and interval colonoscopy, given this episode of diverticulitis, her excellent functional state and NEVER having undergone colonoscopy.

## 2022-07-26 NOTE — PROGRESS NOTE ADULT - PROBLEM SELECTOR PLAN 1
Patient presented to ED with severe abdominal pain, CT abd showed acute uncomplicated proximal sigmoid diverticulitis. Symptoms are markedly improved today.  - Continue IV Zosyn  - Tolerated clear liquid diet. Advance to low residue diet today.  - monitor labs.  - Zofran PRN for nausea/vomiting  - Surgery Moe following.   - GI  following.

## 2022-07-26 NOTE — PROGRESS NOTE ADULT - PROBLEM SELECTOR PLAN 2
UA showed moderate leukocyte esterase, >50 WBC, occasional bacteria. Patient afebrile and denies urinary symptoms on examination  - On abx for diverticulitis - will cover UTI if present, but doubt active infection.  - f/u blood, urine cx

## 2022-07-27 ENCOUNTER — TRANSCRIPTION ENCOUNTER (OUTPATIENT)
Age: 87
End: 2022-07-27

## 2022-07-27 VITALS
OXYGEN SATURATION: 95 % | SYSTOLIC BLOOD PRESSURE: 109 MMHG | DIASTOLIC BLOOD PRESSURE: 61 MMHG | HEART RATE: 69 BPM | RESPIRATION RATE: 16 BRPM | TEMPERATURE: 98 F

## 2022-07-27 LAB
ANION GAP SERPL CALC-SCNC: 6 MMOL/L — SIGNIFICANT CHANGE UP (ref 5–17)
BUN SERPL-MCNC: 13 MG/DL — SIGNIFICANT CHANGE UP (ref 7–23)
CALCIUM SERPL-MCNC: 8.7 MG/DL — SIGNIFICANT CHANGE UP (ref 8.5–10.1)
CHLORIDE SERPL-SCNC: 109 MMOL/L — HIGH (ref 96–108)
CO2 SERPL-SCNC: 28 MMOL/L — SIGNIFICANT CHANGE UP (ref 22–31)
CREAT SERPL-MCNC: 0.67 MG/DL — SIGNIFICANT CHANGE UP (ref 0.5–1.3)
EGFR: 84 ML/MIN/1.73M2 — SIGNIFICANT CHANGE UP
GLUCOSE SERPL-MCNC: 94 MG/DL — SIGNIFICANT CHANGE UP (ref 70–99)
HCT VFR BLD CALC: 34.3 % — LOW (ref 34.5–45)
HGB BLD-MCNC: 11.2 G/DL — LOW (ref 11.5–15.5)
MCHC RBC-ENTMCNC: 31.4 PG — SIGNIFICANT CHANGE UP (ref 27–34)
MCHC RBC-ENTMCNC: 32.7 GM/DL — SIGNIFICANT CHANGE UP (ref 32–36)
MCV RBC AUTO: 96.1 FL — SIGNIFICANT CHANGE UP (ref 80–100)
NRBC # BLD: 0 /100 WBCS — SIGNIFICANT CHANGE UP (ref 0–0)
PLATELET # BLD AUTO: 236 K/UL — SIGNIFICANT CHANGE UP (ref 150–400)
POTASSIUM SERPL-MCNC: 4.1 MMOL/L — SIGNIFICANT CHANGE UP (ref 3.5–5.3)
POTASSIUM SERPL-SCNC: 4.1 MMOL/L — SIGNIFICANT CHANGE UP (ref 3.5–5.3)
RBC # BLD: 3.57 M/UL — LOW (ref 3.8–5.2)
RBC # FLD: 13 % — SIGNIFICANT CHANGE UP (ref 10.3–14.5)
SODIUM SERPL-SCNC: 143 MMOL/L — SIGNIFICANT CHANGE UP (ref 135–145)
WBC # BLD: 6.86 K/UL — SIGNIFICANT CHANGE UP (ref 3.8–10.5)
WBC # FLD AUTO: 6.86 K/UL — SIGNIFICANT CHANGE UP (ref 3.8–10.5)

## 2022-07-27 PROCEDURE — G0378: CPT

## 2022-07-27 PROCEDURE — 85027 COMPLETE CBC AUTOMATED: CPT

## 2022-07-27 PROCEDURE — 93005 ELECTROCARDIOGRAM TRACING: CPT

## 2022-07-27 PROCEDURE — 81001 URINALYSIS AUTO W/SCOPE: CPT

## 2022-07-27 PROCEDURE — 86901 BLOOD TYPING SEROLOGIC RH(D): CPT

## 2022-07-27 PROCEDURE — 71045 X-RAY EXAM CHEST 1 VIEW: CPT

## 2022-07-27 PROCEDURE — 87040 BLOOD CULTURE FOR BACTERIA: CPT

## 2022-07-27 PROCEDURE — 83690 ASSAY OF LIPASE: CPT

## 2022-07-27 PROCEDURE — 80053 COMPREHEN METABOLIC PANEL: CPT

## 2022-07-27 PROCEDURE — 86900 BLOOD TYPING SEROLOGIC ABO: CPT

## 2022-07-27 PROCEDURE — 74177 CT ABD & PELVIS W/CONTRAST: CPT | Mod: MA

## 2022-07-27 PROCEDURE — 36415 COLL VENOUS BLD VENIPUNCTURE: CPT

## 2022-07-27 PROCEDURE — 99239 HOSP IP/OBS DSCHRG MGMT >30: CPT

## 2022-07-27 PROCEDURE — 82962 GLUCOSE BLOOD TEST: CPT

## 2022-07-27 PROCEDURE — 99285 EMERGENCY DEPT VISIT HI MDM: CPT | Mod: 25

## 2022-07-27 PROCEDURE — 96374 THER/PROPH/DIAG INJ IV PUSH: CPT

## 2022-07-27 PROCEDURE — 80048 BASIC METABOLIC PNL TOTAL CA: CPT

## 2022-07-27 PROCEDURE — 87086 URINE CULTURE/COLONY COUNT: CPT

## 2022-07-27 PROCEDURE — 85025 COMPLETE CBC W/AUTO DIFF WBC: CPT

## 2022-07-27 PROCEDURE — 83605 ASSAY OF LACTIC ACID: CPT

## 2022-07-27 PROCEDURE — 86850 RBC ANTIBODY SCREEN: CPT

## 2022-07-27 PROCEDURE — 83880 ASSAY OF NATRIURETIC PEPTIDE: CPT

## 2022-07-27 PROCEDURE — 87635 SARS-COV-2 COVID-19 AMP PRB: CPT

## 2022-07-27 PROCEDURE — 85730 THROMBOPLASTIN TIME PARTIAL: CPT

## 2022-07-27 PROCEDURE — 85610 PROTHROMBIN TIME: CPT

## 2022-07-27 RX ORDER — METRONIDAZOLE 500 MG
500 TABLET ORAL ONCE
Refills: 0 | Status: COMPLETED | OUTPATIENT
Start: 2022-07-27 | End: 2022-07-27

## 2022-07-27 RX ORDER — CEFPODOXIME PROXETIL 100 MG
1 TABLET ORAL
Qty: 16 | Refills: 0
Start: 2022-07-27 | End: 2022-08-03

## 2022-07-27 RX ORDER — CEFUROXIME AXETIL 250 MG
1 TABLET ORAL
Qty: 16 | Refills: 0
Start: 2022-07-27 | End: 2022-08-03

## 2022-07-27 RX ORDER — METRONIDAZOLE 500 MG
1 TABLET ORAL
Qty: 24 | Refills: 0
Start: 2022-07-27 | End: 2022-08-03

## 2022-07-27 RX ORDER — SENNA PLUS 8.6 MG/1
2 TABLET ORAL
Qty: 20 | Refills: 0
Start: 2022-07-27 | End: 2022-08-05

## 2022-07-27 RX ORDER — CEFPODOXIME PROXETIL 100 MG
200 TABLET ORAL EVERY 12 HOURS
Refills: 0 | Status: COMPLETED | OUTPATIENT
Start: 2022-07-27 | End: 2022-07-27

## 2022-07-27 RX ADMIN — TRAMADOL HYDROCHLORIDE 50 MILLIGRAM(S): 50 TABLET ORAL at 15:10

## 2022-07-27 RX ADMIN — PIPERACILLIN AND TAZOBACTAM 25 GRAM(S): 4; .5 INJECTION, POWDER, LYOPHILIZED, FOR SOLUTION INTRAVENOUS at 01:21

## 2022-07-27 RX ADMIN — PIPERACILLIN AND TAZOBACTAM 25 GRAM(S): 4; .5 INJECTION, POWDER, LYOPHILIZED, FOR SOLUTION INTRAVENOUS at 10:28

## 2022-07-27 RX ADMIN — TRAMADOL HYDROCHLORIDE 50 MILLIGRAM(S): 50 TABLET ORAL at 14:10

## 2022-07-27 RX ADMIN — ENOXAPARIN SODIUM 40 MILLIGRAM(S): 100 INJECTION SUBCUTANEOUS at 05:39

## 2022-07-27 RX ADMIN — TRAMADOL HYDROCHLORIDE 50 MILLIGRAM(S): 50 TABLET ORAL at 06:35

## 2022-07-27 RX ADMIN — Medication 500 MILLIGRAM(S): at 18:07

## 2022-07-27 RX ADMIN — CELECOXIB 100 MILLIGRAM(S): 200 CAPSULE ORAL at 14:08

## 2022-07-27 RX ADMIN — Medication 200 MILLIGRAM(S): at 18:07

## 2022-07-27 RX ADMIN — CELECOXIB 100 MILLIGRAM(S): 200 CAPSULE ORAL at 15:08

## 2022-07-27 RX ADMIN — TRAMADOL HYDROCHLORIDE 50 MILLIGRAM(S): 50 TABLET ORAL at 07:13

## 2022-07-27 NOTE — PROGRESS NOTE ADULT - SUBJECTIVE AND OBJECTIVE BOX
[facility]  Rufino Arroyo PA-C  [address] [place] [address]  [phone]      INTERVAL HPI/OVERNIGHT EVENTS:  Pt s/e  Pt reports no abdominal pain, N/V or further GI complaints  Tolerating diet    MEDICATIONS  (STANDING):  celecoxib 100 milliGRAM(s) Oral daily  enoxaparin Injectable 40 milliGRAM(s) SubCutaneous every 24 hours  piperacillin/tazobactam IVPB.. 3.375 Gram(s) IV Intermittent every 8 hours  senna 2 Tablet(s) Oral at bedtime    MEDICATIONS  (PRN):  acetaminophen     Tablet .. 650 milliGRAM(s) Oral every 6 hours PRN Temp greater or equal to 38C (100.4F), Mild Pain (1 - 3)  aluminum hydroxide/magnesium hydroxide/simethicone Suspension 30 milliLiter(s) Oral every 4 hours PRN Dyspepsia  lidocaine   4% Patch 1 Patch Transdermal every 24 hours PRN pain  melatonin 3 milliGRAM(s) Oral at bedtime PRN Insomnia  ondansetron Injectable 4 milliGRAM(s) IV Push every 8 hours PRN Nausea and/or Vomiting  traMADol 50 milliGRAM(s) Oral every 6 hours PRN Moderate Pain (4 - 6)      Allergies  No Known Allergies      PHYSICAL EXAM:   Vital Signs:  Vital Signs Last 24 Hrs  T(C): 36.7 (2022 04:09), Max: 36.8 (2022 13:02)  T(F): 98 (2022 04:09), Max: 98.2 (2022 13:02)  HR: 64 (2022 04:09) (64 - 68)  BP: 103/53 (2022 04:09) (103/53 - 135/71)  BP(mean): --  RR: 16 (2022 04:09) (16 - 18)  SpO2: 93% (2022 04:09) (93% - 94%)    Parameters below as of 2022 04:09  Patient On (Oxygen Delivery Method): room air      Daily     Daily Weight in k.9 (2022 04:09)    GENERAL:  Appears stated age  ABDOMEN:  Soft, non-tender, non-distended  NEURO:  Alert      LABS:                        11.2   6.86  )-----------( 236      ( 2022 06:19 )             34.3     07    143  |  109<H>  |  13  ----------------------------<  94  4.1   |  28  |  0.67    Ca    8.7      2022 06:19    TPro  6.2  /  Alb  2.7<L>  /  TBili  0.4  /  DBili  x   /  AST  15  /  ALT  9<L>  /  AlkPhos  42  07

## 2022-07-27 NOTE — DISCHARGE NOTE NURSING/CASE MANAGEMENT/SOCIAL WORK - PATIENT PORTAL LINK FT
You can access the FollowMyHealth Patient Portal offered by [facility] by registering at the following website: [email or web address]. By joining [facility]’s FollowMyHealth portal, you will also be able to view your health information using other applications (apps) compatible with our system.

## 2022-07-27 NOTE — DISCHARGE NOTE NURSING/CASE MANAGEMENT/SOCIAL WORK - NSDCPEFALRISK_GEN_ALL_CORE
For information on Fall & Injury Prevention, visit: [email or web address] OR  [email or web address] OR  https://www.cdc.gov/steadi/patient.html

## 2022-07-27 NOTE — PROGRESS NOTE ADULT - SUBJECTIVE AND OBJECTIVE BOX
SURGERY PA NOTE ON BEHALF OF DR. MORAN:    S: Patient seen and examined at bedside.   No acute events overnight.  States abdominal pain is improved today, denies flatus or BMs yet.  On a full liquid diet and tolerating.  Denies fevers, chills, N/V, chest pain, SOB, palpitations, calf pain.      MEDICATIONS:  acetaminophen     Tablet .. 650 milliGRAM(s) Oral every 6 hours PRN  aluminum hydroxide/magnesium hydroxide/simethicone Suspension 30 milliLiter(s) Oral every 4 hours PRN  celecoxib 100 milliGRAM(s) Oral daily  enoxaparin Injectable 40 milliGRAM(s) SubCutaneous every 24 hours  lidocaine   4% Patch 1 Patch Transdermal every 24 hours PRN  melatonin 3 milliGRAM(s) Oral at bedtime PRN  ondansetron Injectable 4 milliGRAM(s) IV Push every 8 hours PRN  piperacillin/tazobactam IVPB.. 3.375 Gram(s) IV Intermittent every 8 hours  senna 2 Tablet(s) Oral at bedtime  traMADol 50 milliGRAM(s) Oral every 6 hours PRN      O:  Vital Signs Last 24 Hrs  T(C): 36.7 (27 Jul 2022 04:09), Max: 36.8 (26 Jul 2022 13:02)  T(F): 98 (27 Jul 2022 04:09), Max: 98.2 (26 Jul 2022 13:02)  HR: 64 (27 Jul 2022 04:09) (64 - 68)  BP: 103/53 (27 Jul 2022 04:09) (103/53 - 135/71)  BP(mean): --  RR: 16 (27 Jul 2022 04:09) (16 - 18)  SpO2: 93% (27 Jul 2022 04:09) (93% - 94%)    Parameters below as of 27 Jul 2022 04:09  Patient On (Oxygen Delivery Method): room air        I&O SUMMARY:    07-26-22 @ 07:01  -  07-27-22 @ 07:00  --------------------------------------------------------  IN: 0 mL / OUT: 500 mL / NET: -500 mL        PHYSICAL EXAM:  Lungs: CTA bilat without W/R/R  Card: S1S2  Abd: Soft, NT, ND.  +BS x 4.  No rebound/guarding.    Ext: Calves soft, NT, without edema bilat    LABS:                        11.2   6.86  )-----------( 236      ( 27 Jul 2022 06:19 )             34.3     07-27    143  |  109<H>  |  13  ----------------------------<  94  4.1   |  28  |  0.67    Ca    8.7      27 Jul 2022 06:19    TPro  6.2  /  Alb  2.7<L>  /  TBili  0.4  /  DBili  x   /  AST  15  /  ALT  9<L>  /  AlkPhos  42  07-26    RADIOLOGY:    Assessment:  88 year old female admitted for acute sigmoid diverticulitis hospital course day 2, progressing well with stable vitals and reassuring labs.        Plan:  - No surgical indication at this time  - c/w conservative management  - c/w IV abx - zosyn  - continue care per primary team  - OOB/Ambulate  - Encouraged IS use  - Lovenox for dvt ppx  - surgery team will continue to follow   - will discuss above with Dr. Moran       SURGERY PA NOTE ON BEHALF OF DR. MORAN:    S: Patient seen and examined at bedside.   No acute events overnight.  States abdominal pain is improved today, denies flatus or BMs yet.  On a low fiber diet and tolerating.  Denies fevers, chills, N/V, chest pain, SOB, palpitations, calf pain.      MEDICATIONS:  acetaminophen     Tablet .. 650 milliGRAM(s) Oral every 6 hours PRN  aluminum hydroxide/magnesium hydroxide/simethicone Suspension 30 milliLiter(s) Oral every 4 hours PRN  celecoxib 100 milliGRAM(s) Oral daily  enoxaparin Injectable 40 milliGRAM(s) SubCutaneous every 24 hours  lidocaine   4% Patch 1 Patch Transdermal every 24 hours PRN  melatonin 3 milliGRAM(s) Oral at bedtime PRN  ondansetron Injectable 4 milliGRAM(s) IV Push every 8 hours PRN  piperacillin/tazobactam IVPB.. 3.375 Gram(s) IV Intermittent every 8 hours  senna 2 Tablet(s) Oral at bedtime  traMADol 50 milliGRAM(s) Oral every 6 hours PRN      O:  Vital Signs Last 24 Hrs  T(C): 36.7 (27 Jul 2022 04:09), Max: 36.8 (26 Jul 2022 13:02)  T(F): 98 (27 Jul 2022 04:09), Max: 98.2 (26 Jul 2022 13:02)  HR: 64 (27 Jul 2022 04:09) (64 - 68)  BP: 103/53 (27 Jul 2022 04:09) (103/53 - 135/71)  BP(mean): --  RR: 16 (27 Jul 2022 04:09) (16 - 18)  SpO2: 93% (27 Jul 2022 04:09) (93% - 94%)    Parameters below as of 27 Jul 2022 04:09  Patient On (Oxygen Delivery Method): room air        I&O SUMMARY:    07-26-22 @ 07:01  -  07-27-22 @ 07:00  --------------------------------------------------------  IN: 0 mL / OUT: 500 mL / NET: -500 mL        PHYSICAL EXAM:  Lungs: CTA bilat without W/R/R  Card: S1S2  Abd: Soft, NT, ND.  +BS x 4.  No rebound/guarding.    Ext: Calves soft, NT, without edema bilat    LABS:                        11.2   6.86  )-----------( 236      ( 27 Jul 2022 06:19 )             34.3     07-27    143  |  109<H>  |  13  ----------------------------<  94  4.1   |  28  |  0.67    Ca    8.7      27 Jul 2022 06:19    TPro  6.2  /  Alb  2.7<L>  /  TBili  0.4  /  DBili  x   /  AST  15  /  ALT  9<L>  /  AlkPhos  42  07-26    RADIOLOGY:    Assessment:  88 year old female admitted for acute sigmoid diverticulitis hospital course day 2, progressing well with stable vitals and reassuring labs.        Plan:  - No surgical indication at this time  - c/w conservative management  - c/w IV abx - zosyn  - continue care per primary team  - OOB/Ambulate  - Encouraged IS use  - Lovenox for dvt ppx  - surgery team will continue to follow   - will discuss above with Dr. Moran       SURGERY PA NOTE ON BEHALF OF DR. MORAN:    S: Patient seen and examined at bedside.   No acute events overnight.  States abdominal pain is improved today, denies flatus or BMs yet.  On a low fiber diet and tolerating.  Denies fevers, chills, N/V, chest pain, SOB, palpitations, calf pain.      MEDICATIONS:  acetaminophen     Tablet .. 650 milliGRAM(s) Oral every 6 hours PRN  aluminum hydroxide/magnesium hydroxide/simethicone Suspension 30 milliLiter(s) Oral every 4 hours PRN  celecoxib 100 milliGRAM(s) Oral daily  enoxaparin Injectable 40 milliGRAM(s) SubCutaneous every 24 hours  lidocaine   4% Patch 1 Patch Transdermal every 24 hours PRN  melatonin 3 milliGRAM(s) Oral at bedtime PRN  ondansetron Injectable 4 milliGRAM(s) IV Push every 8 hours PRN  piperacillin/tazobactam IVPB.. 3.375 Gram(s) IV Intermittent every 8 hours  senna 2 Tablet(s) Oral at bedtime  traMADol 50 milliGRAM(s) Oral every 6 hours PRN      O:  Vital Signs Last 24 Hrs  T(C): 36.7 (27 Jul 2022 04:09), Max: 36.8 (26 Jul 2022 13:02)  T(F): 98 (27 Jul 2022 04:09), Max: 98.2 (26 Jul 2022 13:02)  HR: 64 (27 Jul 2022 04:09) (64 - 68)  BP: 103/53 (27 Jul 2022 04:09) (103/53 - 135/71)  BP(mean): --  RR: 16 (27 Jul 2022 04:09) (16 - 18)  SpO2: 93% (27 Jul 2022 04:09) (93% - 94%)    Parameters below as of 27 Jul 2022 04:09  Patient On (Oxygen Delivery Method): room air        I&O SUMMARY:    07-26-22 @ 07:01  -  07-27-22 @ 07:00  --------------------------------------------------------  IN: 0 mL / OUT: 500 mL / NET: -500 mL        PHYSICAL EXAM:  Lungs: CTA bilat without W/R/R  Card: S1S2  Abd: Soft, NT, ND.  +BS x 4.  No rebound/guarding.    Ext: Calves soft, NT, without edema bilat    LABS:                        11.2   6.86  )-----------( 236      ( 27 Jul 2022 06:19 )             34.3     07-27    143  |  109<H>  |  13  ----------------------------<  94  4.1   |  28  |  0.67    Ca    8.7      27 Jul 2022 06:19    TPro  6.2  /  Alb  2.7<L>  /  TBili  0.4  /  DBili  x   /  AST  15  /  ALT  9<L>  /  AlkPhos  42  07-26    RADIOLOGY:    Assessment:  88 year old female admitted for acute sigmoid diverticulitis hospital course day 2, progressing well with stable vitals and reassuring labs.        Plan:  - No surgical indication at this time  - stable for discharge from surgical perspective   - c/w care per primary team   - Recommend outpatient follow up with GI on discharge  - OOB/Ambulate  - Encouraged IS use  - Lovenox for dvt ppx  - surgery team will continue to follow   - will discuss above with Dr. Moran

## 2022-07-27 NOTE — DISCHARGE NOTE PROVIDER - ATTENDING DISCHARGE PHYSICAL EXAMINATION:
VITAL SIGNS:  Vital Signs Last 24 Hrs  T(C): 36.7 (07-27-22 @ 04:09), Max: 36.8 (07-26-22 @ 13:02)  T(F): 98 (07-27-22 @ 04:09), Max: 98.2 (07-26-22 @ 13:02)  HR: 64 (07-27-22 @ 04:09) (64 - 68)  BP: 103/53 (07-27-22 @ 04:09) (103/53 - 135/71)  BP(mean): --  RR: 16 (07-27-22 @ 04:09) (16 - 18)  SpO2: 93% (07-27-22 @ 04:09) (93% - 94%)      PHYSICAL EXAM:     GENERAL: no acute distress  HEENT: NC/AT, EOMI, neck supple, MMM  RESPIRATORY: LCTAB/L, no rhonchi, rales, or wheezing  CARDIOVASCULAR: RRR, no murmurs, gallops, rubs  ABDOMINAL: soft, non-tender, non-distended, positive bowel sounds   EXTREMITIES: no clubbing, cyanosis, or edema  NEUROLOGICAL: alert and oriented x 3, non-focal  SKIN: warm, dry, intact  MUSCULOSKELETAL: no gross joint deformity

## 2022-07-27 NOTE — PROGRESS NOTE ADULT - ASSESSMENT
diverticulitis  abdominal pain  abnormal CT     CT noted  cont abx  diet as tolerated  outpatient colonoscopy in 8 weeks  upon d/c would recommend total 10 day course of abx, PO vantin 200mg BID  d/w pt    I reviewed the overnight course of events on the unit, re-confirming the patient history. I discussed the care with the patient and their family  The plan of care was discussed with the physician assistant and modifications were made to the notation where appropriate.   Differential diagnosis and plan of care discussed with patient after the evaluation  35 minutes spent on total encounter of which more than fifty percent of the encounter was spent counseling and/or coordinating care by the attending physician.  Advanced care planning was discussed with patient and family.  Advanced care planning forms were reviewed and discussed.  Risks, benefits and alternatives of gastroenterologic procedures were discussed in detail and all questions were answered.

## 2022-07-27 NOTE — DISCHARGE NOTE PROVIDER - HOSPITAL COURSE
ADMISSION HPI:  87 yo female with PMH of chronic back pain (Tramadol, Celebrex at home), s/p hernia repair and appendectomy presents to ED with severe, diffuse, and constant abdominal pain for 2 days. Pain began on morning of 7/22 after having a meal, which continued to increase in severity. By 7/24, patient was unable to sleep through the prior evening and only drank water, skipping all meals. Patient takes Tramadol for chronic back pain and is known to have some constipation at baseline. Stomach pain was initially thought to be secondary to constipation and Miralax was given which did not improve symptoms. Son insisted that the patient be brought to the hospital as symptoms were worsening. Patient reports continued abdominal pain and chronic constipation. Never had diverticulitis before. No bloody BMs. States that she has occasional SOB but none recently or at present. Denies fevers, chills, chest pain, palpitations, dyspnea, headache, n/v/d.     In the ED:  VS: /63, HR 76, 98.1F, RR 16, SpO2 96% on RA   Labs significant for: WBC 10.51, H/h 11.3/34.5  EKG: Sinus rhythm with premature atrial complexes, nonspecific T wave abnormality, HR 66 bpm  CXR wet read: no acute infiltrates, fibrotic appearing lungs  CT abd/pelvis: acute uncomplicated proximal sigmoid diverticulitis  Received: 1L NS, ofirmev, zosyn in ED (25 Jul 2022 03:35)    HOSPITAL COURSE:  Patient was admitted for acute uncomplicated sigmoid diverticulitis. She was treated with NPO status, IV Zosyn, IV fluids. She was evaluated by Dr. Moe from general surgery and Dr. Abarca from GI. As her symptoms improved, she was advanced through clear liquid diet and to low fiber diet. She showed drastic improvement in her symptoms between hospital days 1 and 2. After >48 hours of IV antibiotics and >24 hours of tolerating low residue diet for 24 hours, she was cleared for discharge home. She will complete a course of Ceftin and Flagyl to make 10 days total of treatment including the IV Zosyn she received in the hospital. She will need outpatient follow-up with her PCP and with GI. PCP's office was notified.            ADMISSION HPI:  87 yo female with PMH of chronic back pain (Tramadol, Celebrex at home), s/p hernia repair and appendectomy presents to ED with severe, diffuse, and constant abdominal pain for 2 days. Pain began on morning of 7/22 after having a meal, which continued to increase in severity. By 7/24, patient was unable to sleep through the prior evening and only drank water, skipping all meals. Patient takes Tramadol for chronic back pain and is known to have some constipation at baseline. Stomach pain was initially thought to be secondary to constipation and Miralax was given which did not improve symptoms. Son insisted that the patient be brought to the hospital as symptoms were worsening. Patient reports continued abdominal pain and chronic constipation. Never had diverticulitis before. No bloody BMs. States that she has occasional SOB but none recently or at present. Denies fevers, chills, chest pain, palpitations, dyspnea, headache, n/v/d.     In the ED:  VS: /63, HR 76, 98.1F, RR 16, SpO2 96% on RA   Labs significant for: WBC 10.51, H/h 11.3/34.5  EKG: Sinus rhythm with premature atrial complexes, nonspecific T wave abnormality, HR 66 bpm  CXR wet read: no acute infiltrates, fibrotic appearing lungs  CT abd/pelvis: acute uncomplicated proximal sigmoid diverticulitis  Received: 1L NS, ofirmev, zosyn in ED (25 Jul 2022 03:35)    HOSPITAL COURSE:  Patient was admitted for acute uncomplicated sigmoid diverticulitis. She was treated with NPO status, IV Zosyn, IV fluids. She was evaluated by Dr. Moe from general surgery and Dr. Abarca from GI. As her symptoms improved, she was advanced through clear liquid diet and to low fiber diet. She showed drastic improvement in her symptoms between hospital days 1 and 2. After >48 hours of IV antibiotics and >24 hours of tolerating low residue diet for 24 hours, she was cleared for discharge home. She will complete a course of Vantin and Flagyl to make 10 days total of treatment including the IV Zosyn she received in the hospital. She will need outpatient follow-up with her PCP and with GI. PCP's office was notified.

## 2022-07-27 NOTE — DISCHARGE NOTE PROVIDER - NSDCCPCAREPLAN_GEN_ALL_CORE_FT
PRINCIPAL DISCHARGE DIAGNOSIS  Diagnosis: Acute diverticulitis  Assessment and Plan of Treatment: You were treated for acute diverticulitis, which is an infection of outpouchings in your colon. You received IV antibiotics and you will need continued antiboitics by mouth to complete the course of treatment.   Take Ceftin (Cefuroxime) 500mg twice daily for 8 days starting this evening.  Take Flagyl (Metronidazole) 500 mg twice daily for 8 days starting this evening.   Do not drink alcohol while taking these medications. This can make you very ill.   Maintain a low fiber diet until your symptoms have improved. Then gradually work toward a high fiber diet to prevent repeat episodes of diverticulitis.   Use stool softeners (like Senna which was prescribed to you) as needed to make sure you are having at least one bowel movement daily without any difficulty as constipation can worsen diverticulosis.   Please seek urgent medical attention if you experience worsening abdominal pain, fevers, chills, diarrhea, bleeding from your rectum, or any other concerning symptoms.      SECONDARY DISCHARGE DIAGNOSES  Diagnosis: Chronic back pain  Assessment and Plan of Treatment: Continue your home medication regimen.     PRINCIPAL DISCHARGE DIAGNOSIS  Diagnosis: Acute diverticulitis  Assessment and Plan of Treatment: You were treated for acute diverticulitis, which is an infection of outpouchings in your colon. You received IV antibiotics and you will need continued antiboitics by mouth to complete the course of treatment.   Take Vantin (Cefpodoxime) 200 mg every 12 hours for 8 days starting this evening.  Take Flagyl (Metronidazole) 500 mg every 8 hours daily for 8 days starting this evening.   Do not drink alcohol while taking these medications. This can make you very ill.   Maintain a low fiber diet until your symptoms have improved. Then gradually work toward a high fiber diet to prevent repeat episodes of diverticulitis.   Use stool softeners (like Senna which was prescribed to you) as needed to make sure you are having at least one bowel movement daily without any difficulty as constipation can worsen diverticulosis.   Please seek urgent medical attention if you experience worsening abdominal pain, fevers, chills, diarrhea, bleeding from your rectum, or any other concerning symptoms.      SECONDARY DISCHARGE DIAGNOSES  Diagnosis: Chronic back pain  Assessment and Plan of Treatment: Continue your home medication regimen.

## 2022-07-27 NOTE — DISCHARGE NOTE PROVIDER - PROVIDER TOKENS
PROVIDER:[TOKEN:[[id number]],FOLLOWUP:[1 week]],PROVIDER:[TOKEN:[[id number]],FOLLOWUP:[2 weeks]]

## 2022-07-27 NOTE — DISCHARGE NOTE PROVIDER - NSDCMRMEDTOKEN_GEN_ALL_CORE_FT
cefuroxime 500 mg oral tablet: 1 tab(s) orally every 12 hours   celecoxib 100 mg oral capsule: 1 cap(s) orally once a day  metroNIDAZOLE 500 mg oral tablet: 1 tab(s) orally 3 times a day   senna leaf extract oral tablet: 2 tab(s) orally once a day (at bedtime)  traMADol 50 mg oral tablet: 1 tab(s) orally every 6 hours, As Needed   cefpodoxime 200 mg oral tablet: 1 tab(s) orally every 12 hours   celecoxib 100 mg oral capsule: 1 cap(s) orally once a day  metroNIDAZOLE 500 mg oral tablet: 1 tab(s) orally 3 times a day   senna leaf extract oral tablet: 2 tab(s) orally once a day (at bedtime)  traMADol 50 mg oral tablet: 1 tab(s) orally every 6 hours, As Needed

## 2022-07-30 LAB
CULTURE RESULTS: SIGNIFICANT CHANGE UP
CULTURE RESULTS: SIGNIFICANT CHANGE UP
SPECIMEN SOURCE: SIGNIFICANT CHANGE UP
SPECIMEN SOURCE: SIGNIFICANT CHANGE UP

## 2022-11-08 NOTE — ED PROVIDER NOTE - DATE/TIME 1
[Restricted in physically strenuous activity but ambulatory and able to carry out work of a light or sedentary nature] : Status 1- Restricted in physically strenuous activity but ambulatory and able to carry out work of a light or sedentary nature, e.g., light house work, office work [Normal] : affect appropriate 09-Jul-2021 15:43

## 2023-05-17 ENCOUNTER — EMERGENCY (EMERGENCY)
Facility: HOSPITAL | Age: 88
LOS: 0 days | Discharge: HOME | End: 2023-05-17
Attending: STUDENT IN AN ORGANIZED HEALTH CARE EDUCATION/TRAINING PROGRAM
Payer: MEDICARE

## 2023-05-17 VITALS — WEIGHT: 100.09 LBS

## 2023-05-17 VITALS
DIASTOLIC BLOOD PRESSURE: 55 MMHG | HEART RATE: 60 BPM | RESPIRATION RATE: 17 BRPM | TEMPERATURE: 98 F | SYSTOLIC BLOOD PRESSURE: 139 MMHG | OXYGEN SATURATION: 96 %

## 2023-05-17 DIAGNOSIS — J84.10 PULMONARY FIBROSIS, UNSPECIFIED: ICD-10-CM

## 2023-05-17 DIAGNOSIS — M54.50 LOW BACK PAIN, UNSPECIFIED: ICD-10-CM

## 2023-05-17 DIAGNOSIS — G89.29 OTHER CHRONIC PAIN: ICD-10-CM

## 2023-05-17 DIAGNOSIS — E87.5 HYPERKALEMIA: ICD-10-CM

## 2023-05-17 DIAGNOSIS — Z90.49 ACQUIRED ABSENCE OF OTHER SPECIFIED PARTS OF DIGESTIVE TRACT: Chronic | ICD-10-CM

## 2023-05-17 DIAGNOSIS — Z98.890 OTHER SPECIFIED POSTPROCEDURAL STATES: Chronic | ICD-10-CM

## 2023-05-17 DIAGNOSIS — Z90.49 ACQUIRED ABSENCE OF OTHER SPECIFIED PARTS OF DIGESTIVE TRACT: ICD-10-CM

## 2023-05-17 DIAGNOSIS — M54.9 DORSALGIA, UNSPECIFIED: ICD-10-CM

## 2023-05-17 DIAGNOSIS — R31.9 HEMATURIA, UNSPECIFIED: ICD-10-CM

## 2023-05-17 LAB
ALBUMIN SERPL ELPH-MCNC: 3.9 G/DL — SIGNIFICANT CHANGE UP (ref 3.3–5)
ALP SERPL-CCNC: 57 U/L — SIGNIFICANT CHANGE UP (ref 40–120)
ALT FLD-CCNC: 18 U/L — SIGNIFICANT CHANGE UP (ref 12–78)
ANION GAP SERPL CALC-SCNC: 2 MMOL/L — LOW (ref 5–17)
APPEARANCE UR: CLEAR — SIGNIFICANT CHANGE UP
AST SERPL-CCNC: 21 U/L — SIGNIFICANT CHANGE UP (ref 15–37)
BASOPHILS # BLD AUTO: 0.03 K/UL — SIGNIFICANT CHANGE UP (ref 0–0.2)
BASOPHILS NFR BLD AUTO: 0.4 % — SIGNIFICANT CHANGE UP (ref 0–2)
BILIRUB SERPL-MCNC: 0.3 MG/DL — SIGNIFICANT CHANGE UP (ref 0.2–1.2)
BILIRUB UR-MCNC: NEGATIVE — SIGNIFICANT CHANGE UP
BUN SERPL-MCNC: 21 MG/DL — SIGNIFICANT CHANGE UP (ref 7–23)
CALCIUM SERPL-MCNC: 9.4 MG/DL — SIGNIFICANT CHANGE UP (ref 8.5–10.1)
CHLORIDE SERPL-SCNC: 105 MMOL/L — SIGNIFICANT CHANGE UP (ref 96–108)
CO2 SERPL-SCNC: 31 MMOL/L — SIGNIFICANT CHANGE UP (ref 22–31)
COLOR SPEC: YELLOW — SIGNIFICANT CHANGE UP
CREAT SERPL-MCNC: 0.7 MG/DL — SIGNIFICANT CHANGE UP (ref 0.5–1.3)
DIFF PNL FLD: ABNORMAL
EGFR: 83 ML/MIN/1.73M2 — SIGNIFICANT CHANGE UP
EOSINOPHIL # BLD AUTO: 0.06 K/UL — SIGNIFICANT CHANGE UP (ref 0–0.5)
EOSINOPHIL NFR BLD AUTO: 0.8 % — SIGNIFICANT CHANGE UP (ref 0–6)
GLUCOSE SERPL-MCNC: 103 MG/DL — HIGH (ref 70–99)
GLUCOSE UR QL: NEGATIVE — SIGNIFICANT CHANGE UP
HCT VFR BLD CALC: 38.9 % — SIGNIFICANT CHANGE UP (ref 34.5–45)
HGB BLD-MCNC: 12.4 G/DL — SIGNIFICANT CHANGE UP (ref 11.5–15.5)
IMM GRANULOCYTES NFR BLD AUTO: 0.4 % — SIGNIFICANT CHANGE UP (ref 0–0.9)
KETONES UR-MCNC: NEGATIVE — SIGNIFICANT CHANGE UP
LEUKOCYTE ESTERASE UR-ACNC: ABNORMAL
LYMPHOCYTES # BLD AUTO: 2.36 K/UL — SIGNIFICANT CHANGE UP (ref 1–3.3)
LYMPHOCYTES # BLD AUTO: 30.5 % — SIGNIFICANT CHANGE UP (ref 13–44)
MCHC RBC-ENTMCNC: 31 PG — SIGNIFICANT CHANGE UP (ref 27–34)
MCHC RBC-ENTMCNC: 31.9 GM/DL — LOW (ref 32–36)
MCV RBC AUTO: 97.3 FL — SIGNIFICANT CHANGE UP (ref 80–100)
MONOCYTES # BLD AUTO: 0.46 K/UL — SIGNIFICANT CHANGE UP (ref 0–0.9)
MONOCYTES NFR BLD AUTO: 6 % — SIGNIFICANT CHANGE UP (ref 2–14)
NEUTROPHILS # BLD AUTO: 4.79 K/UL — SIGNIFICANT CHANGE UP (ref 1.8–7.4)
NEUTROPHILS NFR BLD AUTO: 61.9 % — SIGNIFICANT CHANGE UP (ref 43–77)
NITRITE UR-MCNC: NEGATIVE — SIGNIFICANT CHANGE UP
PH UR: 6 — SIGNIFICANT CHANGE UP (ref 5–8)
PLATELET # BLD AUTO: 227 K/UL — SIGNIFICANT CHANGE UP (ref 150–400)
POTASSIUM SERPL-MCNC: 5.8 MMOL/L — HIGH (ref 3.5–5.3)
POTASSIUM SERPL-SCNC: 5.8 MMOL/L — HIGH (ref 3.5–5.3)
PROT SERPL-MCNC: 8.1 GM/DL — SIGNIFICANT CHANGE UP (ref 6–8.3)
PROT UR-MCNC: NEGATIVE — SIGNIFICANT CHANGE UP
RBC # BLD: 4 M/UL — SIGNIFICANT CHANGE UP (ref 3.8–5.2)
RBC # FLD: 13.4 % — SIGNIFICANT CHANGE UP (ref 10.3–14.5)
SODIUM SERPL-SCNC: 138 MMOL/L — SIGNIFICANT CHANGE UP (ref 135–145)
SP GR SPEC: 1.01 — SIGNIFICANT CHANGE UP (ref 1.01–1.02)
UROBILINOGEN FLD QL: NEGATIVE — SIGNIFICANT CHANGE UP
WBC # BLD: 7.73 K/UL — SIGNIFICANT CHANGE UP (ref 3.8–10.5)
WBC # FLD AUTO: 7.73 K/UL — SIGNIFICANT CHANGE UP (ref 3.8–10.5)

## 2023-05-17 PROCEDURE — G1004: CPT

## 2023-05-17 PROCEDURE — 85025 COMPLETE CBC W/AUTO DIFF WBC: CPT

## 2023-05-17 PROCEDURE — 80053 COMPREHEN METABOLIC PANEL: CPT

## 2023-05-17 PROCEDURE — 99284 EMERGENCY DEPT VISIT MOD MDM: CPT

## 2023-05-17 PROCEDURE — 99284 EMERGENCY DEPT VISIT MOD MDM: CPT | Mod: 25

## 2023-05-17 PROCEDURE — 71250 CT THORAX DX C-: CPT | Mod: 26,MG

## 2023-05-17 PROCEDURE — 87086 URINE CULTURE/COLONY COUNT: CPT

## 2023-05-17 PROCEDURE — 72131 CT LUMBAR SPINE W/O DYE: CPT | Mod: MG

## 2023-05-17 PROCEDURE — 36415 COLL VENOUS BLD VENIPUNCTURE: CPT

## 2023-05-17 PROCEDURE — 72131 CT LUMBAR SPINE W/O DYE: CPT | Mod: 26,MG

## 2023-05-17 PROCEDURE — 72128 CT CHEST SPINE W/O DYE: CPT | Mod: 26,MG

## 2023-05-17 PROCEDURE — 81001 URINALYSIS AUTO W/SCOPE: CPT

## 2023-05-17 PROCEDURE — 71250 CT THORAX DX C-: CPT | Mod: MG

## 2023-05-17 RX ORDER — LIDOCAINE 4 G/100G
1 CREAM TOPICAL ONCE
Refills: 0 | Status: COMPLETED | OUTPATIENT
Start: 2023-05-17 | End: 2023-05-17

## 2023-05-17 RX ORDER — ACETAMINOPHEN 500 MG
650 TABLET ORAL ONCE
Refills: 0 | Status: COMPLETED | OUTPATIENT
Start: 2023-05-17 | End: 2023-05-17

## 2023-05-17 RX ORDER — GABAPENTIN 400 MG/1
1 CAPSULE ORAL
Qty: 21 | Refills: 0
Start: 2023-05-17 | End: 2023-05-23

## 2023-05-17 RX ORDER — GABAPENTIN 400 MG/1
300 CAPSULE ORAL ONCE
Refills: 0 | Status: COMPLETED | OUTPATIENT
Start: 2023-05-17 | End: 2023-05-17

## 2023-05-17 RX ADMIN — Medication 20 MILLIGRAM(S): at 13:41

## 2023-05-17 RX ADMIN — Medication 650 MILLIGRAM(S): at 13:40

## 2023-05-17 RX ADMIN — GABAPENTIN 300 MILLIGRAM(S): 400 CAPSULE ORAL at 13:41

## 2023-05-17 RX ADMIN — LIDOCAINE 1 PATCH: 4 CREAM TOPICAL at 13:38

## 2023-05-17 NOTE — ED ADULT NURSE NOTE - NSFALLRISKFACTORS_ED_ALL_ED
Age: 85 years old or older/Bone Condition: Including osteoporosis, prolonged steroid use or metastatic bone disease/cancer

## 2023-05-17 NOTE — ED STATDOCS - NS_ ATTENDINGSCRIBEDETAILS _ED_A_ED_FT
The scribe's documentation has been prepared under my direction and personally reviewed by me in its entirety. I confirm that the note above accurately reflects all work, treatment, procedures, and medical decision making performed by me.  LUCIA Falcon-MS, MD  Internal/Emergency/Critical Care Medicine

## 2023-05-17 NOTE — ED ADULT NURSE NOTE - OBJECTIVE STATEMENT
Left mid back pain under the care of pain management increasing pain since Mother Day.  No UTI symptoms.

## 2023-05-17 NOTE — ED ADULT TRIAGE NOTE - CHIEF COMPLAINT QUOTE
BIB SON, PT C/O BACK PAIN X 1 WEEK WORSENING TODAY. DENIES INJURY. DENIES CP/SOB/N/V/D/FEVER/CHILLS.  PT IS ON MEDICATION FOR BACK PAIN. PMH: CHRONIC BACK PAIN, HIGH POTASSIUM.

## 2023-05-17 NOTE — ED STATDOCS - ATTENDING APP SHARED VISIT CONTRIBUTION OF CARE
I personally saw the patient with the PA, and completed the key components of the history and physical exam. I then discussed the management plan with the PA.  LUCIA Falcon-MS, MD  Internal/Emergency/Critical Care Medicine

## 2023-05-17 NOTE — ED STATDOCS - PHYSICAL EXAMINATION
PHYSICAL EXAM:  GENERAL: in NAD, Sitting comfortable in bed, in no respiratory distress  HEAD: Atraumatic, no castro's sign, no periorbital ecchymosis   EYES: PERRL, EOMs intact b/l w/out deficits  ENMT: Moist membranes, no anterior/posterior, or supraclavicular LAD  CHEST/LUNG: CTAB no wheezes/rhonchi/rales, +L CVA tenderness and focal rib tenderness at the posterior 7tha nd 8th ribs  HEART: RRR no murmur/gallops/rubs  ABDOMEN: +BS, soft, NT, ND  EXTREMITIES: No LE edema, +2 radial pulses b/l  NERVOUS SYSTEM:  A&Ox4, No motor deficits, able to ambulate w/ cane   BACK: No midline tenderness, no stepoffs, no deformities noted  Heme/LYMPH: No ecchymosis or bruising or LAD  SKIN:  No new rashes or DTIs

## 2023-05-17 NOTE — ED STATDOCS - PROGRESS NOTE DETAILS
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

## 2023-05-17 NOTE — ED STATDOCS - NSFOLLOWUPINSTRUCTIONS_ED_ALL_ED_FT
follow up with your PMD for repeat blood work to check your potassium  hold celebrex  take tylenol as needed for pain  you may purchase Lidoderm patches over the counter       Back Pain    WHAT YOU NEED TO KNOW:    Back pain is common. It can be caused by many conditions, such as arthritis or the breakdown of spinal discs. Your risk for back pain is increased by injuries, lack of activity, or repeated bending and twisting. You may feel sore or stiff on one or both sides of your back. The pain may spread to your buttocks or thighs.    DISCHARGE INSTRUCTIONS:    Return to the emergency department if:     You have pain, numbness, or weakness in one or both legs.      Your pain becomes so severe that you cannot walk.      You cannot control your urine or bowel movements.      You have severe back pain with chest pain.      You have severe back pain, nausea, and vomiting.      You have severe back pain that spreads to your side or genital area.    Contact your healthcare provider if:     You have back pain that does not get better with rest and pain medicine.      You have a fever.      You have pain that worsens when you are on your back or when you rest.      You have pain that worsens when you cough or sneeze.      You lose weight without trying.      You have questions or concerns about your condition or care.    Medicines:     NSAIDs help decrease swelling and pain. This medicine is available with or without a doctor's order. NSAIDs can cause stomach bleeding or kidney problems in certain people. If you take blood thinner medicine, always ask your healthcare provider if NSAIDs are safe for you. Always read the medicine label and follow directions.      Acetaminophen decreases pain and fever. It is available without a doctor's order. Ask how much to take and how often to take it. Follow directions. Read the labels of all other medicines you are using to see if they also contain acetaminophen, or ask your doctor or pharmacist. Acetaminophen can cause liver damage if not taken correctly. Do not use more than 4 grams (4,000 milligrams) total of acetaminophen in one day.       Muscle relaxers help decrease muscle spasms and back pain.      Prescription pain medicine may be given. Ask your healthcare provider how to take this medicine safely. Some prescription pain medicines contain acetaminophen. Do not take other medicines that contain acetaminophen without talking to your healthcare provider. Too much acetaminophen may cause liver damage. Prescription pain medicine may cause constipation. Ask your healthcare provider how to prevent or treat constipation.       Take your medicine as directed. Contact your healthcare provider if you think your medicine is not helping or if you have side effects. Tell him or her if you are allergic to any medicine. Keep a list of the medicines, vitamins, and herbs you take. Include the amounts, and when and why you take them. Bring the list or the pill bottles to follow-up visits. Carry your medicine list with you in case of an emergency.    How to manage your back pain:     Apply ice on your back for 15 to 20 minutes every hour or as directed. Use an ice pack, or put crushed ice in a plastic bag. Cover it with a towel before you apply it to your skin. Ice helps prevent tissue damage and decreases pain.      Apply heat on your back for 20 to 30 minutes every 2 hours for as many days as directed. Heat helps decrease pain and muscle spasms.      Stay active as much as you can without causing more pain. Bed rest could make your back pain worse. Avoid heavy lifting until your pain is gone.      Go to physical therapy as directed. A physical therapist can teach you exercises to help improve movement and strength, and to decrease pain.    Follow up with your healthcare provider in 2 weeks, or as directed: Write down your questions so you remember to ask them during your visits.

## 2023-05-17 NOTE — ED STATDOCS - OBJECTIVE STATEMENT
88 y/o female w/ PMHx of chronic back pain, bladder lift, appendectomy, hernia repair presents to the ED c/o back pain x2 weeks, worsening over the past few today. Pt is on pain medication for her back pain, but now the pain has become intolerable. Pt is an 89 y/o female w/ PMHx of chronic back pain, bladder lift, appendectomy, hernia repair presents to the ED c/o back pain x2 weeks, worsening over the past few today. Pt is on pain medication for her back pain, but now the pain has become intolerable.

## 2023-05-17 NOTE — ED STATDOCS - PATIENT PORTAL LINK FT
You can access the FollowMyHealth Patient Portal offered by [facility] by registering at the following website: [email or web address]. By joining [employer]’s FollowMyHealth portal, you will also be able to view your health information using other applications (apps) compatible with our system.

## 2023-05-17 NOTE — ED STATDOCS - CLINICAL SUMMARY MEDICAL DECISION MAKING FREE TEXT BOX
88 y/o female w/ PMHx of chronic back pain and vaginal mesh who p/w acute on chronic back pain, atraumatic w/o any red flag symptoms. Given Physical more concerned for pathologic rib fx, given left flank tenderness focally vs. acute on chronic arthritis, however no red flag symptoms warranting MRI imaging at this time.   Will get:  CBC, CMP, UA  Will get:  CT chest to r/o pathologic rib fx. Will get CT C and L spine to r/o any other gross abnormality.  Will give:  Pain medication including steroids, gabapentin, Tylenol, lidocaine patch. Will re-evaluate pain after imaging, likely will be able to d/c estefanía w/ f/u w/ pain mgt as an o/p

## 2023-05-19 LAB
CULTURE RESULTS: SIGNIFICANT CHANGE UP
SPECIMEN SOURCE: SIGNIFICANT CHANGE UP

## 2023-06-26 NOTE — ED PROVIDER NOTE - TOBACCO USE
Time (Mins): 2
Prep: The treated area was degreased with alcohol prep-pads.
Treatment Number: 0
Post Peel Care: After the procedure, the treatment area was washed with water, and a post-peel cream was applied. Sun protection and post-care instructions were reviewed with the patient.
Detail Level: Zone
Erythema: mild
Number Of Coats: 3
Frost (0,1+,2+,3+,4+): 1+
Chemical Peel: 15% TCA
Consent: Prior to the procedure, written consent was obtained and risks were reviewed, including but not limited to: redness, peeling, blistering, pigmentary change, scarring, infection, and pain.
Post-Care Instructions: I reviewed with the patient in detail post-care instructions. Patient should avoid sun exposure and wear sun protection.
Never smoker

## 2023-06-30 ENCOUNTER — APPOINTMENT (OUTPATIENT)
Dept: OBGYN | Facility: CLINIC | Age: 88
End: 2023-06-30
Payer: MEDICARE

## 2023-06-30 PROCEDURE — 57160 INSERT PESSARY/OTHER DEVICE: CPT

## 2023-07-02 ENCOUNTER — EMERGENCY (EMERGENCY)
Facility: HOSPITAL | Age: 88
LOS: 0 days | Discharge: HOME | End: 2023-07-02
Attending: EMERGENCY MEDICINE
Payer: MEDICARE

## 2023-07-02 VITALS
RESPIRATION RATE: 18 BRPM | SYSTOLIC BLOOD PRESSURE: 160 MMHG | OXYGEN SATURATION: 100 % | HEIGHT: 63 IN | TEMPERATURE: 98 F | DIASTOLIC BLOOD PRESSURE: 69 MMHG | HEART RATE: 87 BPM | WEIGHT: 119.93 LBS

## 2023-07-02 VITALS
OXYGEN SATURATION: 100 % | SYSTOLIC BLOOD PRESSURE: 166 MMHG | TEMPERATURE: 98 F | RESPIRATION RATE: 18 BRPM | DIASTOLIC BLOOD PRESSURE: 68 MMHG | HEART RATE: 69 BPM

## 2023-07-02 DIAGNOSIS — R30.0 DYSURIA: ICD-10-CM

## 2023-07-02 DIAGNOSIS — N39.0 URINARY TRACT INFECTION, SITE NOT SPECIFIED: ICD-10-CM

## 2023-07-02 DIAGNOSIS — Z90.49 ACQUIRED ABSENCE OF OTHER SPECIFIED PARTS OF DIGESTIVE TRACT: Chronic | ICD-10-CM

## 2023-07-02 DIAGNOSIS — Z90.49 ACQUIRED ABSENCE OF OTHER SPECIFIED PARTS OF DIGESTIVE TRACT: ICD-10-CM

## 2023-07-02 DIAGNOSIS — Z98.890 OTHER SPECIFIED POSTPROCEDURAL STATES: Chronic | ICD-10-CM

## 2023-07-02 DIAGNOSIS — R10.32 LEFT LOWER QUADRANT PAIN: ICD-10-CM

## 2023-07-02 LAB
ALBUMIN SERPL ELPH-MCNC: 3.6 G/DL — SIGNIFICANT CHANGE UP (ref 3.3–5)
ALP SERPL-CCNC: 58 U/L — SIGNIFICANT CHANGE UP (ref 40–120)
ALT FLD-CCNC: 15 U/L — SIGNIFICANT CHANGE UP (ref 12–78)
ANION GAP SERPL CALC-SCNC: 3 MMOL/L — LOW (ref 5–17)
APPEARANCE UR: CLEAR — SIGNIFICANT CHANGE UP
APTT BLD: 28.2 SEC — SIGNIFICANT CHANGE UP (ref 27.5–35.5)
AST SERPL-CCNC: 17 U/L — SIGNIFICANT CHANGE UP (ref 15–37)
BACTERIA # UR AUTO: ABNORMAL
BASOPHILS # BLD AUTO: 0.03 K/UL — SIGNIFICANT CHANGE UP (ref 0–0.2)
BASOPHILS NFR BLD AUTO: 0.3 % — SIGNIFICANT CHANGE UP (ref 0–2)
BILIRUB SERPL-MCNC: 0.4 MG/DL — SIGNIFICANT CHANGE UP (ref 0.2–1.2)
BILIRUB UR-MCNC: NEGATIVE — SIGNIFICANT CHANGE UP
BUN SERPL-MCNC: 13 MG/DL — SIGNIFICANT CHANGE UP (ref 7–23)
CALCIUM SERPL-MCNC: 9 MG/DL — SIGNIFICANT CHANGE UP (ref 8.5–10.1)
CHLORIDE SERPL-SCNC: 105 MMOL/L — SIGNIFICANT CHANGE UP (ref 96–108)
CO2 SERPL-SCNC: 29 MMOL/L — SIGNIFICANT CHANGE UP (ref 22–31)
COLOR SPEC: YELLOW — SIGNIFICANT CHANGE UP
CREAT SERPL-MCNC: 0.72 MG/DL — SIGNIFICANT CHANGE UP (ref 0.5–1.3)
DIFF PNL FLD: ABNORMAL
EGFR: 80 ML/MIN/1.73M2 — SIGNIFICANT CHANGE UP
EOSINOPHIL # BLD AUTO: 0.12 K/UL — SIGNIFICANT CHANGE UP (ref 0–0.5)
EOSINOPHIL NFR BLD AUTO: 1.2 % — SIGNIFICANT CHANGE UP (ref 0–6)
EPI CELLS # UR: SIGNIFICANT CHANGE UP
GLUCOSE SERPL-MCNC: 91 MG/DL — SIGNIFICANT CHANGE UP (ref 70–99)
GLUCOSE UR QL: NEGATIVE — SIGNIFICANT CHANGE UP
HCT VFR BLD CALC: 39 % — SIGNIFICANT CHANGE UP (ref 34.5–45)
HGB BLD-MCNC: 12.4 G/DL — SIGNIFICANT CHANGE UP (ref 11.5–15.5)
IMM GRANULOCYTES NFR BLD AUTO: 0.3 % — SIGNIFICANT CHANGE UP (ref 0–0.9)
INR BLD: 1.1 RATIO — SIGNIFICANT CHANGE UP (ref 0.88–1.16)
KETONES UR-MCNC: NEGATIVE — SIGNIFICANT CHANGE UP
LACTATE SERPL-SCNC: 1 MMOL/L — SIGNIFICANT CHANGE UP (ref 0.7–2)
LEUKOCYTE ESTERASE UR-ACNC: ABNORMAL
LYMPHOCYTES # BLD AUTO: 2.05 K/UL — SIGNIFICANT CHANGE UP (ref 1–3.3)
LYMPHOCYTES # BLD AUTO: 20.1 % — SIGNIFICANT CHANGE UP (ref 13–44)
MCHC RBC-ENTMCNC: 30.8 PG — SIGNIFICANT CHANGE UP (ref 27–34)
MCHC RBC-ENTMCNC: 31.8 GM/DL — LOW (ref 32–36)
MCV RBC AUTO: 96.8 FL — SIGNIFICANT CHANGE UP (ref 80–100)
MONOCYTES # BLD AUTO: 0.73 K/UL — SIGNIFICANT CHANGE UP (ref 0–0.9)
MONOCYTES NFR BLD AUTO: 7.2 % — SIGNIFICANT CHANGE UP (ref 2–14)
NEUTROPHILS # BLD AUTO: 7.23 K/UL — SIGNIFICANT CHANGE UP (ref 1.8–7.4)
NEUTROPHILS NFR BLD AUTO: 70.9 % — SIGNIFICANT CHANGE UP (ref 43–77)
NITRITE UR-MCNC: NEGATIVE — SIGNIFICANT CHANGE UP
PH UR: 6 — SIGNIFICANT CHANGE UP (ref 5–8)
PLATELET # BLD AUTO: 213 K/UL — SIGNIFICANT CHANGE UP (ref 150–400)
POTASSIUM SERPL-MCNC: 4.4 MMOL/L — SIGNIFICANT CHANGE UP (ref 3.5–5.3)
POTASSIUM SERPL-SCNC: 4.4 MMOL/L — SIGNIFICANT CHANGE UP (ref 3.5–5.3)
PROT SERPL-MCNC: 7.7 GM/DL — SIGNIFICANT CHANGE UP (ref 6–8.3)
PROT UR-MCNC: NEGATIVE — SIGNIFICANT CHANGE UP
PROTHROM AB SERPL-ACNC: 12.8 SEC — SIGNIFICANT CHANGE UP (ref 10.5–13.4)
RBC # BLD: 4.03 M/UL — SIGNIFICANT CHANGE UP (ref 3.8–5.2)
RBC # FLD: 13.3 % — SIGNIFICANT CHANGE UP (ref 10.3–14.5)
RBC CASTS # UR COMP ASSIST: SIGNIFICANT CHANGE UP /HPF (ref 0–4)
SODIUM SERPL-SCNC: 137 MMOL/L — SIGNIFICANT CHANGE UP (ref 135–145)
SP GR SPEC: 1.01 — SIGNIFICANT CHANGE UP (ref 1.01–1.02)
TROPONIN I, HIGH SENSITIVITY RESULT: 17.54 NG/L — SIGNIFICANT CHANGE UP
UROBILINOGEN FLD QL: NEGATIVE — SIGNIFICANT CHANGE UP
WBC # BLD: 10.19 K/UL — SIGNIFICANT CHANGE UP (ref 3.8–10.5)
WBC # FLD AUTO: 10.19 K/UL — SIGNIFICANT CHANGE UP (ref 3.8–10.5)
WBC UR QL: ABNORMAL /HPF (ref 0–5)

## 2023-07-02 PROCEDURE — 81001 URINALYSIS AUTO W/SCOPE: CPT

## 2023-07-02 PROCEDURE — 87040 BLOOD CULTURE FOR BACTERIA: CPT

## 2023-07-02 PROCEDURE — 87086 URINE CULTURE/COLONY COUNT: CPT

## 2023-07-02 PROCEDURE — 71045 X-RAY EXAM CHEST 1 VIEW: CPT

## 2023-07-02 PROCEDURE — 99285 EMERGENCY DEPT VISIT HI MDM: CPT

## 2023-07-02 PROCEDURE — 85610 PROTHROMBIN TIME: CPT

## 2023-07-02 PROCEDURE — 85730 THROMBOPLASTIN TIME PARTIAL: CPT

## 2023-07-02 PROCEDURE — 85025 COMPLETE CBC W/AUTO DIFF WBC: CPT

## 2023-07-02 PROCEDURE — 74177 CT ABD & PELVIS W/CONTRAST: CPT | Mod: MA

## 2023-07-02 PROCEDURE — 99285 EMERGENCY DEPT VISIT HI MDM: CPT | Mod: 25

## 2023-07-02 PROCEDURE — 93005 ELECTROCARDIOGRAM TRACING: CPT

## 2023-07-02 PROCEDURE — 83605 ASSAY OF LACTIC ACID: CPT

## 2023-07-02 PROCEDURE — 71045 X-RAY EXAM CHEST 1 VIEW: CPT | Mod: 26

## 2023-07-02 PROCEDURE — 74177 CT ABD & PELVIS W/CONTRAST: CPT | Mod: 26,MA

## 2023-07-02 PROCEDURE — 93010 ELECTROCARDIOGRAM REPORT: CPT

## 2023-07-02 PROCEDURE — 84484 ASSAY OF TROPONIN QUANT: CPT

## 2023-07-02 PROCEDURE — 80053 COMPREHEN METABOLIC PANEL: CPT

## 2023-07-02 PROCEDURE — 36415 COLL VENOUS BLD VENIPUNCTURE: CPT

## 2023-07-02 RX ORDER — CIPROFLOXACIN LACTATE 400MG/40ML
1 VIAL (ML) INTRAVENOUS
Qty: 14 | Refills: 0
Start: 2023-07-02 | End: 2023-07-08

## 2023-07-02 RX ORDER — METRONIDAZOLE 500 MG
500 TABLET ORAL ONCE
Refills: 0 | Status: COMPLETED | OUTPATIENT
Start: 2023-07-02 | End: 2023-07-02

## 2023-07-02 RX ORDER — CIPROFLOXACIN LACTATE 400MG/40ML
500 VIAL (ML) INTRAVENOUS ONCE
Refills: 0 | Status: COMPLETED | OUTPATIENT
Start: 2023-07-02 | End: 2023-07-02

## 2023-07-02 RX ORDER — METRONIDAZOLE 500 MG
1 TABLET ORAL
Qty: 21 | Refills: 0
Start: 2023-07-02 | End: 2023-07-08

## 2023-07-02 RX ADMIN — Medication 500 MILLIGRAM(S): at 11:54

## 2023-07-02 RX ADMIN — Medication 500 MILLIGRAM(S): at 11:55

## 2023-07-02 NOTE — ED ADULT NURSE NOTE - OBJECTIVE STATEMENT
pt is 89 yo female presents to ED c/o left flank pain and burning upon urination x 1 week. pt took tylenol around 6am this morning.  denies abd pain, nvd, fever, chills.  dtr at the bedside.  pt aaox4, pt mainly speaks Khmer, dtr translating for patient.

## 2023-07-02 NOTE — ED PROVIDER NOTE - CARE PROVIDER_API CALL
Bud Zamudio  Gastroenterology  [address] [place] [id number]  Phone: [phone]  Fax: [phone]  Follow Up Time: 1-3 Days

## 2023-07-02 NOTE — ED PROVIDER NOTE - OBJECTIVE STATEMENT
89-year-old female presents to the ED with left flank pain and burning with urination.  Patient has had burning with urination for the past week went to urgent care was prescribed antibiotics for UTI over the last week symptoms have become worse with left flank pain lower abdominal pain and persistent burning took 2 Tylenol this morning with some help no fevers no vomiting no numbness or weakness no other symptoms.

## 2023-07-02 NOTE — ED ADULT NURSE NOTE - NSFALLHARMRISKINTERV_ED_ALL_ED

## 2023-07-02 NOTE — ED PROVIDER NOTE - PHYSICAL EXAMINATION
Constitutional: NAD AAOx3  Eyes: PERRLA EOMI  Head: Normocephalic atraumatic  Mouth: MMM  Cardiac: regular rate normal peripheral pulses no LE swelling  Resp: unlabored breathing clear b/l   GI: Abd s/nd suprapubic ttp left cvat no rebound or guarding no palpable aorta felt  Neuro: grossly normal and intact  Skin: No visible rashes

## 2023-07-02 NOTE — ED PROVIDER NOTE - NS ED ROS FT
Constitutional: No fever or chills  Eyes: No visual changes  HEENT: No throat pain  CV: No chest pain  Resp: No SOB no cough  GI: No abd pain, nausea or vomiting + flank pain  : + dysuria  MSK: No musculoskeletal pain  Skin: No rash  Neuro: No headache

## 2023-07-02 NOTE — ED ADULT TRIAGE NOTE - AS HEIGHT TYPE
Patient Outreach Note    Reports [facility] visited once and informed patient that MD did not feel services necessary.  Active with [employer] [facility] [employer] for volunteering, exercise program, and lunch time meals.  Drives self to the  Center.  IADL's,  Review of gaps in care and will follow-up in Sept. For the flu vaccination.  Discussed scheduling of AWV and states daughter has scheduled with her PCP.  Adherent to medication regimen and encouraged to take medications for ARNP's review at upcoming appt.   Denies needs or concerns today. Does have a living will and is scanned in EMR.      Renny Foley RN    8/12/2019, 1:27 PM      
stated

## 2023-07-02 NOTE — ED ADULT NURSE NOTE - CAS ELECT INFOMATION PROVIDED
pt c/o cough, body aches and sore throat  denies fever/chills  pt states her brother is covid+
DC instructions

## 2023-07-02 NOTE — ED ADULT TRIAGE NOTE - CHIEF COMPLAINT QUOTE
pt presents to ed for evaluation of flank pain with associated burning on urination and trouble urinating. denies fevers

## 2023-07-02 NOTE — ED PROVIDER NOTE - CLINICAL SUMMARY MEDICAL DECISION MAKING FREE TEXT BOX
89-year-old female presents to the ED with left flank pain and dysuria in the setting of being treated for UTI concern for UTI/pyelo-pyelolower suspicion for kidney stone aortic dissection or ruptured AAA will check labs urine CT and reassess 89-year-old female presents to the ED with left flank pain and dysuria in the setting of being treated for UTI concern for UTI/pyelo-pyelolower suspicion for kidney stone aortic dissection or ruptured AAA will check labs urine CT and reassess    All labs imaging reviewed by myself.  White count normal lactate normal UA positive for UTI CT reviewed patient with focal diverticulitis other incidental findings reviewed with patient and family patient feeling better tolerating p.o. vital signs stable.  Patient and family want to go home no signs of sepsis patient was previously taking Keflex will prescribe oral antibiotics and DC with follow-up.

## 2023-07-02 NOTE — ED PROVIDER NOTE - NSFOLLOWUPINSTRUCTIONS_ED_ALL_ED_FT
1. return for worsening symptoms or anything concerning to you  2. take all home meds as prescribed  3. follow up with your pmd call to make an appointment  4. take cipro and flagyl as directed - do not drink while taking this medication  5. follow up with GI see attached    Diverticulitis    WHAT YOU NEED TO KNOW:    Diverticulitis is a condition that causes small pockets along your intestine called diverticula to become inflamed or infected. This is caused by hard bowel movements, food, or bacteria that get stuck in the pockets.         DISCHARGE INSTRUCTIONS:    Return to the emergency department if:     You have bowel movement or foul-smelling discharge leaking from your vagina or in your urine.      You have severe diarrhea.      You urinate less than usual or not at all.      You are not able to have a bowel movement.      You cannot stop vomiting.       You have severe abdominal pain, a fever, and your abdomen is larger than usual.       You have new or increased blood in your bowel movements.     Contact your healthcare provider if:     You have pain when you urinate.      Your symptoms get worse or do not go away.       You have questions or concerns about your condition or care.     Medicines:     Antibiotics may be given to help treat a bacterial infection.      Prescription pain medicine may be given. Ask your healthcare provider how to take this medicine safely. Some prescription pain medicines contain acetaminophen. Do not take other medicines that contain acetaminophen without talking to your healthcare provider. Too much acetaminophen may cause liver damage. Prescription pain medicine may cause constipation. Ask your healthcare provider how to prevent or treat constipation.       Take your medicine as directed. Contact your healthcare provider if you think your medicine is not helping or if you have side effects. Tell him or her if you are allergic to any medicine. Keep a list of the medicines, vitamins, and herbs you take. Include the amounts, and when and why you take them. Bring the list or the pill bottles to follow-up visits. Carry your medicine list with you in case of an emergency.    Clear liquid diet: A clear liquid diet includes any liquids that you can see through. Examples include water, ginger-gildardo, cranberry or apple juice, frozen fruit ice, or broth. Stay on a clear liquid diet until your symptoms are gone, or as directed.     Follow up with your healthcare provider as directed: You may need to return for a colonoscopy. When your symptoms are gone, you may need a low-fat, high-fiber diet to prevent diverticulitis from developing again. Your healthcare provider or dietitian can help you create meal plans. Write down your questions so you remember to ask them during your visits.    Urinary Tract Infection, Adult  ImageA urinary tract infection (UTI) is an infection of any part of the urinary tract, which includes the kidneys, ureters, bladder, and urethra. These organs make, store, and get rid of urine in the body. UTI can be a bladder infection (cystitis) or kidney infection (pyelonephritis).    What are the causes?  This infection may be caused by fungi, viruses, or bacteria. Bacteria are the most common cause of UTIs. This condition can also be caused by repeated incomplete emptying of the bladder during urination.    What increases the risk?  This condition is more likely to develop if:    You ignore your need to urinate or hold urine for long periods of time.  You do not empty your bladder completely during urination.  You wipe back to front after urinating or having a bowel movement, if you are female.  You are uncircumcised, if you are male.  You are constipated.  You have a urinary catheter that stays in place (indwelling).  You have a weak defense (immune) system.  You have a medical condition that affects your bowels, kidneys, or bladder.  You have diabetes.  You take antibiotic medicines frequently or for long periods of time, and the antibiotics no longer work well against certain types of infections (antibiotic resistance).  You take medicines that irritate your urinary tract.  You are exposed to chemicals that irritate your urinary tract.  You are female.    What are the signs or symptoms?  Symptoms of this condition include:    Fever.  Frequent urination or passing small amounts of urine frequently.  Needing to urinate urgently.  Pain or burning with urination.  Urine that smells bad or unusual.  Cloudy urine.  Pain in the lower abdomen or back.  Trouble urinating.  Blood in the urine.  Vomiting or being less hungry than normal.  Diarrhea or abdominal pain.  Vaginal discharge, if you are female.    How is this diagnosed?  This condition is diagnosed with a medical history and physical exam. You will also need to provide a urine sample to test your urine. Other tests may be done, including:    Blood tests.  Sexually transmitted disease (STD) testing.    If you have had more than one UTI, a cystoscopy or imaging studies may be done to determine the cause of the infections.    How is this treated?  Treatment for this condition often includes a combination of two or more of the following:    Antibiotic medicine.  Other medicines to treat less common causes of UTI.  Over-the-counter medicines to treat pain.  Drinking enough water to stay hydrated.    Follow these instructions at home:  Take over-the-counter and prescription medicines only as told by your health care provider.  If you were prescribed an antibiotic, take it as told by your health care provider. Do not stop taking the antibiotic even if you start to feel better.  Avoid alcohol, caffeine, tea, and carbonated beverages. They can irritate your bladder.  Drink enough fluid to keep your urine clear or pale yellow.  Keep all follow-up visits as told by your health care provider. This is important.  ImageMake sure to:    Empty your bladder often and completely. Do not hold urine for long periods of time.  Empty your bladder before and after sex.  Wipe from front to back after a bowel movement if you are female. Use each tissue one time when you wipe.    Contact a health care provider if:  You have back pain.  You have a fever.  You feel nauseous or vomit.  Your symptoms do not get better after 3 days.  Your symptoms go away and then return.  Get help right away if:  You have severe back pain or lower abdominal pain.  You are vomiting and cannot keep down any medicines or water.  This information is not intended to replace advice given to you by your health care provider. Make sure you discuss any questions you have with your health care provider.

## 2023-07-03 LAB
CULTURE RESULTS: SIGNIFICANT CHANGE UP
SPECIMEN SOURCE: SIGNIFICANT CHANGE UP

## 2023-09-25 ENCOUNTER — EMERGENCY (EMERGENCY)
Facility: HOSPITAL | Age: 88
LOS: 1 days | Discharge: HOME | End: 2023-09-25
Attending: STUDENT IN AN ORGANIZED HEALTH CARE EDUCATION/TRAINING PROGRAM | Admitting: EMERGENCY MEDICINE
Payer: MEDICARE

## 2023-09-25 VITALS
OXYGEN SATURATION: 95 % | TEMPERATURE: 98 F | DIASTOLIC BLOOD PRESSURE: 64 MMHG | WEIGHT: 95.02 LBS | RESPIRATION RATE: 18 BRPM | HEART RATE: 65 BPM | HEIGHT: 62 IN | SYSTOLIC BLOOD PRESSURE: 133 MMHG

## 2023-09-25 VITALS
TEMPERATURE: 98 F | SYSTOLIC BLOOD PRESSURE: 143 MMHG | HEART RATE: 71 BPM | RESPIRATION RATE: 16 BRPM | OXYGEN SATURATION: 99 % | DIASTOLIC BLOOD PRESSURE: 76 MMHG

## 2023-09-25 DIAGNOSIS — Z90.49 ACQUIRED ABSENCE OF OTHER SPECIFIED PARTS OF DIGESTIVE TRACT: Chronic | ICD-10-CM

## 2023-09-25 DIAGNOSIS — Z98.890 OTHER SPECIFIED POSTPROCEDURAL STATES: Chronic | ICD-10-CM

## 2023-09-25 LAB
ALBUMIN SERPL ELPH-MCNC: 3.8 G/DL — SIGNIFICANT CHANGE UP (ref 3.3–5)
ALP SERPL-CCNC: 54 U/L — SIGNIFICANT CHANGE UP (ref 40–120)
ALT FLD-CCNC: 20 U/L — SIGNIFICANT CHANGE UP (ref 12–78)
ANION GAP SERPL CALC-SCNC: 6 MMOL/L — SIGNIFICANT CHANGE UP (ref 5–17)
APPEARANCE UR: CLEAR — SIGNIFICANT CHANGE UP
AST SERPL-CCNC: 18 U/L — SIGNIFICANT CHANGE UP (ref 15–37)
BACTERIA # UR AUTO: ABNORMAL /HPF
BASOPHILS # BLD AUTO: 0.04 K/UL — SIGNIFICANT CHANGE UP (ref 0–0.2)
BASOPHILS NFR BLD AUTO: 0.6 % — SIGNIFICANT CHANGE UP (ref 0–2)
BILIRUB SERPL-MCNC: 0.5 MG/DL — SIGNIFICANT CHANGE UP (ref 0.2–1.2)
BILIRUB UR-MCNC: NEGATIVE — SIGNIFICANT CHANGE UP
BUN SERPL-MCNC: 16 MG/DL — SIGNIFICANT CHANGE UP (ref 7–23)
CALCIUM SERPL-MCNC: 9.5 MG/DL — SIGNIFICANT CHANGE UP (ref 8.5–10.1)
CHLORIDE SERPL-SCNC: 101 MMOL/L — SIGNIFICANT CHANGE UP (ref 96–108)
CO2 SERPL-SCNC: 32 MMOL/L — HIGH (ref 22–31)
COLOR SPEC: YELLOW — SIGNIFICANT CHANGE UP
COMMENT - URINE: SIGNIFICANT CHANGE UP
CREAT SERPL-MCNC: 0.73 MG/DL — SIGNIFICANT CHANGE UP (ref 0.5–1.3)
DIFF PNL FLD: ABNORMAL
EGFR: 79 ML/MIN/1.73M2 — SIGNIFICANT CHANGE UP
EOSINOPHIL # BLD AUTO: 0.06 K/UL — SIGNIFICANT CHANGE UP (ref 0–0.5)
EOSINOPHIL NFR BLD AUTO: 0.9 % — SIGNIFICANT CHANGE UP (ref 0–6)
GLUCOSE SERPL-MCNC: 85 MG/DL — SIGNIFICANT CHANGE UP (ref 70–99)
GLUCOSE UR QL: NEGATIVE MG/DL — SIGNIFICANT CHANGE UP
HCT VFR BLD CALC: 40.1 % — SIGNIFICANT CHANGE UP (ref 34.5–45)
HGB BLD-MCNC: 13 G/DL — SIGNIFICANT CHANGE UP (ref 11.5–15.5)
IMM GRANULOCYTES NFR BLD AUTO: 0.3 % — SIGNIFICANT CHANGE UP (ref 0–0.9)
KETONES UR-MCNC: NEGATIVE MG/DL — SIGNIFICANT CHANGE UP
LEUKOCYTE ESTERASE UR-ACNC: ABNORMAL
LYMPHOCYTES # BLD AUTO: 1.95 K/UL — SIGNIFICANT CHANGE UP (ref 1–3.3)
LYMPHOCYTES # BLD AUTO: 29.9 % — SIGNIFICANT CHANGE UP (ref 13–44)
MCHC RBC-ENTMCNC: 31 PG — SIGNIFICANT CHANGE UP (ref 27–34)
MCHC RBC-ENTMCNC: 32.4 GM/DL — SIGNIFICANT CHANGE UP (ref 32–36)
MCV RBC AUTO: 95.7 FL — SIGNIFICANT CHANGE UP (ref 80–100)
MONOCYTES # BLD AUTO: 0.53 K/UL — SIGNIFICANT CHANGE UP (ref 0–0.9)
MONOCYTES NFR BLD AUTO: 8.1 % — SIGNIFICANT CHANGE UP (ref 2–14)
NEUTROPHILS # BLD AUTO: 3.92 K/UL — SIGNIFICANT CHANGE UP (ref 1.8–7.4)
NEUTROPHILS NFR BLD AUTO: 60.2 % — SIGNIFICANT CHANGE UP (ref 43–77)
NITRITE UR-MCNC: NEGATIVE — SIGNIFICANT CHANGE UP
NRBC # BLD: 0 /100 WBCS — SIGNIFICANT CHANGE UP (ref 0–0)
PH UR: 6 — SIGNIFICANT CHANGE UP (ref 5–8)
PLATELET # BLD AUTO: 236 K/UL — SIGNIFICANT CHANGE UP (ref 150–400)
POTASSIUM SERPL-MCNC: 4.9 MMOL/L — SIGNIFICANT CHANGE UP (ref 3.5–5.3)
POTASSIUM SERPL-SCNC: 4.9 MMOL/L — SIGNIFICANT CHANGE UP (ref 3.5–5.3)
PROT SERPL-MCNC: 8.3 G/DL — SIGNIFICANT CHANGE UP (ref 6–8.3)
PROT UR-MCNC: NEGATIVE MG/DL — SIGNIFICANT CHANGE UP
RBC # BLD: 4.19 M/UL — SIGNIFICANT CHANGE UP (ref 3.8–5.2)
RBC # FLD: 13 % — SIGNIFICANT CHANGE UP (ref 10.3–14.5)
RBC CASTS # UR COMP ASSIST: 1 /HPF — SIGNIFICANT CHANGE UP (ref 0–4)
SODIUM SERPL-SCNC: 139 MMOL/L — SIGNIFICANT CHANGE UP (ref 135–145)
SP GR SPEC: 1.01 — SIGNIFICANT CHANGE UP (ref 1–1.03)
UROBILINOGEN FLD QL: 0.2 MG/DL — SIGNIFICANT CHANGE UP (ref 0.2–1)
WBC # BLD: 6.52 K/UL — SIGNIFICANT CHANGE UP (ref 3.8–10.5)
WBC # FLD AUTO: 6.52 K/UL — SIGNIFICANT CHANGE UP (ref 3.8–10.5)
WBC UR QL: 18 /HPF — HIGH (ref 0–5)

## 2023-09-25 PROCEDURE — 85025 COMPLETE CBC W/AUTO DIFF WBC: CPT

## 2023-09-25 PROCEDURE — 96374 THER/PROPH/DIAG INJ IV PUSH: CPT

## 2023-09-25 PROCEDURE — 81001 URINALYSIS AUTO W/SCOPE: CPT

## 2023-09-25 PROCEDURE — 99285 EMERGENCY DEPT VISIT HI MDM: CPT

## 2023-09-25 PROCEDURE — 71250 CT THORAX DX C-: CPT | Mod: MA

## 2023-09-25 PROCEDURE — 80053 COMPREHEN METABOLIC PANEL: CPT

## 2023-09-25 PROCEDURE — 99284 EMERGENCY DEPT VISIT MOD MDM: CPT | Mod: 25

## 2023-09-25 PROCEDURE — 36415 COLL VENOUS BLD VENIPUNCTURE: CPT

## 2023-09-25 PROCEDURE — 74176 CT ABD & PELVIS W/O CONTRAST: CPT | Mod: MA

## 2023-09-25 PROCEDURE — 96375 TX/PRO/DX INJ NEW DRUG ADDON: CPT

## 2023-09-25 PROCEDURE — 71250 CT THORAX DX C-: CPT | Mod: 26,MA

## 2023-09-25 PROCEDURE — 74176 CT ABD & PELVIS W/O CONTRAST: CPT | Mod: 26,MA

## 2023-09-25 RX ORDER — MORPHINE SULFATE 50 MG/1
2 CAPSULE, EXTENDED RELEASE ORAL ONCE
Refills: 0 | Status: DISCONTINUED | OUTPATIENT
Start: 2023-09-25 | End: 2023-09-25

## 2023-09-25 RX ORDER — CYCLOBENZAPRINE HYDROCHLORIDE 10 MG/1
1 TABLET, FILM COATED ORAL
Qty: 15 | Refills: 0
Start: 2023-09-25 | End: 2023-09-29

## 2023-09-25 RX ORDER — LIDOCAINE 4 G/100G
1 CREAM TOPICAL ONCE
Refills: 0 | Status: DISCONTINUED | OUTPATIENT
Start: 2023-09-25 | End: 2023-09-29

## 2023-09-25 RX ORDER — ACETAMINOPHEN 500 MG
650 TABLET ORAL ONCE
Refills: 0 | Status: COMPLETED | OUTPATIENT
Start: 2023-09-25 | End: 2023-09-25

## 2023-09-25 RX ORDER — SODIUM CHLORIDE 9 MG/ML
1000 INJECTION INTRAMUSCULAR; INTRAVENOUS; SUBCUTANEOUS ONCE
Refills: 0 | Status: COMPLETED | OUTPATIENT
Start: 2023-09-25 | End: 2023-09-25

## 2023-09-25 RX ADMIN — Medication 260 MILLIGRAM(S): at 18:47

## 2023-09-25 RX ADMIN — MORPHINE SULFATE 2 MILLIGRAM(S): 50 CAPSULE, EXTENDED RELEASE ORAL at 18:48

## 2023-09-25 RX ADMIN — SODIUM CHLORIDE 1000 MILLILITER(S): 9 INJECTION INTRAMUSCULAR; INTRAVENOUS; SUBCUTANEOUS at 18:51

## 2023-09-25 NOTE — ED PROVIDER NOTE - ATTENDING APP SHARED VISIT CONTRIBUTION OF CARE
This was a shared visit with KARTIK. I reviewed and verified the documentation and independently performed the documented MDM.

## 2023-09-25 NOTE — ED ADULT NURSE NOTE - OBJECTIVE STATEMENT
89y F c/o LT flank pain and urinary pain for weeks, worsening past day.. pt denies CP/palpitations, SOB, dizziness, blurry vision, N/V/D.

## 2023-09-25 NOTE — ED ADULT NURSE NOTE - NSFALLRISKINTERV_ED_ALL_ED

## 2023-09-25 NOTE — ED PROVIDER NOTE - PROGRESS NOTE DETAILS
Patient currently not exhibiting any urinary tract infection symptoms, prior urine culture was negative for UTI, CT scan of the chest abdomen and pelvis were reviewed and no acute findings, labs show also no acute findings, patient to be discharged from the emergency department we will continue her tramadol and Celexob we will add Flexeril, patient has appointment to see her pain management this week.

## 2023-09-25 NOTE — ED PROVIDER NOTE - PATIENT PORTAL LINK FT
You can access the FollowMyHealth Patient Portal offered by [facility] by registering at the following website: [email or web address]. By joining [employer]’s FollowMyHealth portal, you will also be able to view your health information using other applications (apps) compatible with our system.

## 2023-09-25 NOTE — ED PROVIDER NOTE - CLINICAL SUMMARY MEDICAL DECISION MAKING FREE TEXT BOX
here with acute on chronic upper back pain and rhonchi on exam. check labs, CT, treat pain, re-evaluate.

## 2023-09-25 NOTE — ED PROVIDER NOTE - NSFOLLOWUPINSTRUCTIONS_ED_ALL_ED_FT
Follow-up with your pain management this week.  May continue taking your tramadol and your celecoxib as directed May take cyclobenzaprine 5 mg orally 3 times a day as needed for muscle spasm.  Return to the emergency department if having fever greater than 100.3 °F, difficulty breathing and or worsening of symptoms.

## 2023-09-25 NOTE — ED PROVIDER NOTE - OBJECTIVE STATEMENT
89 yo F presents to ED c/o left upper back pain x 2 weeks. States pain worse with movement, improves with massages. Pain not responding to her chronic pain meds- celebrex, tramadol. Triage noted- however pt has no UTI symptoms. Pt denies flank pain, hematuria, dysuria, abd pain, fever/chills, trauma, chest pain, SOB, cough.

## 2023-10-18 NOTE — PATIENT PROFILE ADULT - DEAF OR HARD OF HEARING?
We will see you back as planned. If you have any questions or concerns, we can be reached Monday through Friday 8am - 430pm at [phone] (PAC). If you have concerns related to a potential reaction/side effect after hours/weekends/holiday's, please seek emergent medical care.    
no

## 2023-12-08 ENCOUNTER — APPOINTMENT (OUTPATIENT)
Dept: OBGYN | Facility: CLINIC | Age: 88
End: 2023-12-08
Payer: MEDICARE

## 2023-12-08 PROCEDURE — 99213 OFFICE O/P EST LOW 20 MIN: CPT | Mod: 25

## 2024-04-21 NOTE — PATIENT PROFILE ADULT - VISION (WITH CORRECTIVE LENSES IF THE PATIENT USUALLY WEARS THEM):
Normal vision: sees adequately in most situations; can see medication labels, newsprint
You can access the FollowMyHealth Patient Portal offered by [facility] by registering at the following website: [email or web address]. By joining [email or web address] [email or web address] portal, you will also be able to view your health information using other applications (apps) compatible with our system.

## 2024-06-06 ENCOUNTER — APPOINTMENT (OUTPATIENT)
Dept: OBGYN | Facility: CLINIC | Age: 89
End: 2024-06-06

## 2024-06-06 PROCEDURE — 99213 OFFICE O/P EST LOW 20 MIN: CPT

## 2024-10-24 ENCOUNTER — EMERGENCY (EMERGENCY)
Facility: HOSPITAL | Age: 89
LOS: 1 days | Discharge: HOME | End: 2024-10-24
Attending: EMERGENCY MEDICINE | Admitting: EMERGENCY MEDICINE
Payer: MEDICARE

## 2024-10-24 VITALS
TEMPERATURE: 98 F | SYSTOLIC BLOOD PRESSURE: 167 MMHG | DIASTOLIC BLOOD PRESSURE: 74 MMHG | OXYGEN SATURATION: 94 % | HEIGHT: 60 IN | HEART RATE: 89 BPM | RESPIRATION RATE: 18 BRPM | WEIGHT: 98.11 LBS

## 2024-10-24 VITALS
HEART RATE: 75 BPM | SYSTOLIC BLOOD PRESSURE: 165 MMHG | TEMPERATURE: 98 F | DIASTOLIC BLOOD PRESSURE: 72 MMHG | OXYGEN SATURATION: 97 % | RESPIRATION RATE: 18 BRPM

## 2024-10-24 LAB
ALBUMIN SERPL ELPH-MCNC: 3.8 G/DL — SIGNIFICANT CHANGE UP (ref 3.3–5)
ALP SERPL-CCNC: 55 U/L — SIGNIFICANT CHANGE UP (ref 40–120)
ALT FLD-CCNC: 18 U/L — SIGNIFICANT CHANGE UP (ref 12–78)
ANION GAP SERPL CALC-SCNC: 2 MMOL/L — LOW (ref 5–17)
APPEARANCE UR: ABNORMAL
AST SERPL-CCNC: 24 U/L — SIGNIFICANT CHANGE UP (ref 15–37)
BILIRUB SERPL-MCNC: 0.3 MG/DL — SIGNIFICANT CHANGE UP (ref 0.2–1.2)
BILIRUB UR-MCNC: NEGATIVE — SIGNIFICANT CHANGE UP
BUN SERPL-MCNC: 17 MG/DL — SIGNIFICANT CHANGE UP (ref 7–23)
CALCIUM SERPL-MCNC: 9.7 MG/DL — SIGNIFICANT CHANGE UP (ref 8.5–10.1)
CHLORIDE SERPL-SCNC: 102 MMOL/L — SIGNIFICANT CHANGE UP (ref 96–108)
CO2 SERPL-SCNC: 33 MMOL/L — HIGH (ref 22–31)
COLOR SPEC: YELLOW — SIGNIFICANT CHANGE UP
CREAT SERPL-MCNC: 0.76 MG/DL — SIGNIFICANT CHANGE UP (ref 0.5–1.3)
DIFF PNL FLD: ABNORMAL
EGFR: 74 ML/MIN/1.73M2 — SIGNIFICANT CHANGE UP
GLUCOSE SERPL-MCNC: 117 MG/DL — HIGH (ref 70–99)
GLUCOSE UR QL: NEGATIVE MG/DL — SIGNIFICANT CHANGE UP
HCT VFR BLD CALC: 40 % — SIGNIFICANT CHANGE UP (ref 34.5–45)
HGB BLD-MCNC: 13 G/DL — SIGNIFICANT CHANGE UP (ref 11.5–15.5)
KETONES UR-MCNC: NEGATIVE MG/DL — SIGNIFICANT CHANGE UP
LEUKOCYTE ESTERASE UR-ACNC: ABNORMAL
MCHC RBC-ENTMCNC: 31.2 PG — SIGNIFICANT CHANGE UP (ref 27–34)
MCHC RBC-ENTMCNC: 32.5 GM/DL — SIGNIFICANT CHANGE UP (ref 32–36)
MCV RBC AUTO: 95.9 FL — SIGNIFICANT CHANGE UP (ref 80–100)
NITRITE UR-MCNC: NEGATIVE — SIGNIFICANT CHANGE UP
NRBC # BLD: 0 /100 WBCS — SIGNIFICANT CHANGE UP (ref 0–0)
PH UR: 7 — SIGNIFICANT CHANGE UP (ref 5–8)
PLATELET # BLD AUTO: 241 K/UL — SIGNIFICANT CHANGE UP (ref 150–400)
POTASSIUM SERPL-MCNC: 5.6 MMOL/L — HIGH (ref 3.5–5.3)
POTASSIUM SERPL-SCNC: 5.6 MMOL/L — HIGH (ref 3.5–5.3)
PROT SERPL-MCNC: 7.9 G/DL — SIGNIFICANT CHANGE UP (ref 6–8.3)
PROT UR-MCNC: 30 MG/DL
RBC # BLD: 4.17 M/UL — SIGNIFICANT CHANGE UP (ref 3.8–5.2)
RBC # FLD: 13.1 % — SIGNIFICANT CHANGE UP (ref 10.3–14.5)
SODIUM SERPL-SCNC: 137 MMOL/L — SIGNIFICANT CHANGE UP (ref 135–145)
SP GR SPEC: 1.01 — SIGNIFICANT CHANGE UP (ref 1–1.03)
UROBILINOGEN FLD QL: 0.2 MG/DL — SIGNIFICANT CHANGE UP (ref 0.2–1)
WBC # BLD: 7.96 K/UL — SIGNIFICANT CHANGE UP (ref 3.8–10.5)
WBC # FLD AUTO: 7.96 K/UL — SIGNIFICANT CHANGE UP (ref 3.8–10.5)

## 2024-10-24 RX ORDER — MECLIZINE HYDROCLORIDE 25 MG/1
25 TABLET ORAL ONCE
Refills: 0 | Status: COMPLETED | OUTPATIENT
Start: 2024-10-24 | End: 2024-10-24

## 2024-10-24 RX ORDER — SODIUM CHLORIDE 0.9 % (FLUSH) 0.9 %
1000 SYRINGE (ML) INJECTION ONCE
Refills: 0 | Status: COMPLETED | OUTPATIENT
Start: 2024-10-24 | End: 2024-10-24

## 2024-10-24 RX ADMIN — MECLIZINE HYDROCLORIDE 25 MILLIGRAM(S): 25 TABLET ORAL at 14:13

## 2024-10-24 RX ADMIN — Medication 1000 MILLILITER(S): at 14:15

## 2024-10-24 RX ADMIN — Medication 500 MILLIGRAM(S): at 17:41

## 2024-10-24 NOTE — ED PROVIDER NOTE - DISCUSSED CASE WITH MULTISELECT OPTIONS
Patient Demographics     Address  [address] [place] [id number] Phone  [phone] (Home)  [phone] (Mobile)      Patient Contacts     Name Relation Home Work Mobile    Sumi Solomon Mother   [phone]      Allergies as of 1/10/2024  Review status set to In Progress on 1/10/2024   No Known Allergies     Code Status Information     Code Status    Not on file        Patient Instructions       Transfer to [facility] for ENT biopsy of necrotic abscess vs tumor.        Your Home Meds List      You have not been prescribed any medications.           [id number]-A - MAR ACTION REPORT  (last 48 hrs)    ** SITE UNKNOWN **     Order ID Medication Name Action Time Action Reason Comments    [id number] LORazepam (Ativan) 2 mg/mL injection 0.58 mg 01/09/24 2156 Given      [id number] acetaminophen (Ofirmev) 10 mg/mL IV syringe infusion (NICU/Peds) 180 mg 01/10/24 1701 Given      [id number] acetaminophen (Tylenol) 160 MG/5ML oral liquid 172.8 mg (Or Linked Group #1) 01/08/24 2050 Given      [id number] acetaminophen (Tylenol) 160 MG/5ML oral liquid 172.8 mg 01/09/24 0405 Given      [id number] acetaminophen (Tylenol) 160 MG/5ML oral liquid 172.8 mg 01/09/24 1155 Given      [id number] acetaminophen (Tylenol) 160 MG/5ML oral liquid 172.8 mg 01/09/24 2040 Given      [id number] ampicillin-sulbactam (Unasyn) 870 mg in sodium chloride 0.9% 29 mL IV Syringe (NICU/Peds) 01/10/24 1742 New Bag      [id number] cefTRIAXone (Rocephin) 580 mg in sodium chloride 0.9% IV syringe (NICU/Peds) 01/08/24 2235 New Bag      [id number] cefTRIAXone (Rocephin) 580 mg in sodium chloride 0.9% IV syringe (NICU/Peds) 01/09/24 2230 New Bag      [id number] iohexol (Omnipaque) 350 MG/ML injection via power injector 25 mL 01/09/24 2208 Given              Recent Vital Signs    Flowsheet Row Most Recent Value   /70 Filed at 01/10/2024 1630   Pulse 119 Filed at 01/10/2024 1801   Resp 29 Filed at 01/10/2024 1801   Temp 99.4 °F (37.4 °C) Filed at  01/10/2024 1801   SpO2 99 % Filed at 01/10/2024 1801      Patient's Most Recent Weight    Flowsheet Row Most Recent Value   Patient Weight 11.5 kg (25 lb 5.7 oz)         Lab Results Last 24 Hours      Uric Acid [[id number]] (Normal)  Resulted: 01/10/24 1719, Result status: Final result   Ordering provider: Luci Victor MD  01/10/24 1656 Resulting lab: [facility] ([facility])    Specimen Information    Type Source Collected On   Blood — 01/09/24 0414          Components    Component Value Reference Range Flag Lab   Uric Acid 3.4 2.4 - 5.9 mg/dL — [facility])            Testing Performed By     Lab - Abbreviation Name Director Address Valid Date Range    139 - [facility] ([facility]) [facility] ([facility]) Goldberg, Cathryn A. MD [address] [place] [id number] 03/19/20 1441 - Present            Microbiology Results (All)     Procedure Component Value Units Date/Time    Blood Culture [[id number]] Collected: 01/07/24 2252    Order Status: Completed Lab Status: Preliminary result Updated: 01/10/24 0502    Specimen: Blood,peripheral      Blood Culture Result No Growth 2 Days    GI Stool panel by PCR [[id number]]     Order Status: Sent Lab Status: No result     Specimen: Stool     Urine Culture, Routine [[id number]] Collected: 01/07/24 2344    Order Status: Completed Lab Status: Final result Updated: 01/09/24 0706    Specimen: Urine, clean catch      Urine Culture No Growth at 18-24 hrs.    $$$$Respiratory Flu Expanded Panel + Covid-19$$$$ [[id number]]  (Abnormal) Collected: 01/07/24 2320    Order Status: Completed Lab Status: Final result Updated: 01/08/24 0140    Specimen: Other from Nasopharyngeal swab      SARS-CoV-2 PCR: Detected     Adenovirus PCR: Not Detected     Coronavirus 229E PCR: Not Detected     Coronavirus Hku1 PCR: Not Detected     Coronavirus Nl63 PCR: Not Detected     Coronavirus Oc43 PCR: Not Detected     Metapneumovirus PCR: Not Detected      Rhinovirus/Entero PCR: Not Detected     Influenza A PCR: Not Detected     Influenza B PCR: Not Detected     Parainfluenza 1 PCR: Not Detected     Parainfluenza 2 PCR Not Detected     Parainfluenza 3 PCR Not Detected     Parainfluenza 4 PCR Not Detected     Resp Syncytial Virus PCR Not Detected     Bordetella Pertussis PCR Not Detected     Bordetella Parapertussis PCR Not Detected     Chlamydia pneumonia PCR: Not Detected     Mycoplasma pneumonia PCR: Not Detected    Narrative:      This test is intended for the simultaneous qualitative detection and differentiation of nucleic acids from multiple viral and bacterial respiratory organisms, including nucleic acid from Severe Acute Respiratory Syndrome Coronavirus 2 (SARS-CoV-2) in nasopharyngeal swab from individuals suspected of respiratory viral infection consistent with COVID-19 by their healthcare provider.    Test performed using the Sosh Respiratory Panel 2.1 (RP2.1) assay on the Snip.ly 2.0 System, [employer], [employer], [place] [address].    This test is being used under the Food and Drug Administration's Emergency Use Authorization.    The authorized Fact Sheet for Healthcare Providers for this assay is available upon request from the laboratory.    SARS and MERS coronaviruses are not tested on this assay.    SARS-CoV-2/Flu A and B/RSV by PCR (GeneXpert) [[id number]]  (Normal) Collected: 01/07/24 2147    Order Status: Completed Lab Status: Final result Updated: 01/07/24 2238    Specimen: Other from Nares      SARS-CoV-2 (COVID-19) - (GeneXpert) Not Detected     Influenza A by PCR Negative     Influenza B by PCR Negative     RSV by PCR Negative    Narrative:      This test is intended for the qualitative detection and differentiation of SARS-CoV-2, influenza A, influenza B, and respiratory syncytial virus (RSV) viral RNA in nasopharyngeal or nares swabs from individuals suspected of respiratory viral infection consistent with COVID-19 by their  healthcare provider. Signs and symptoms of respiratory viral infection due to SARS-CoV-2, influenza, and RSV can be similar.    Test performed using the Xpert Xpress SARS-CoV-2/FLU/RSV (real time RT-PCR)  assay on the GeneXpert instrument, [employer], [place] [address].   This test is being used under the Food and Drug Administration's Emergency Use Authorization.    The authorized Fact Sheet for Healthcare Providers for this assay is available upon request from the laboratory.      Pending Labs     Order Current Status    Blood Culture Preliminary result         H&P - H&P Note      H&P signed by Louisa Reed MD at 2024  2:36 AM  Version 1 of 1    Author: Louisa Reed MD Service: — Author Type: Physician    Filed: 2024  2:36 AM Date of Service: 2024  2:20 AM Status: Signed    : Louisa Reed MD (Physician)       [facility]  History & Physical    Alyssa Solomon Patient Status:  Inpatient    2021 MRN [id number]   Location [facility] 1SE-B Attending Louisa Reed MD   Hosp Day # 0 PCP Zack Lin MD       HISTORY OF PRESENT ILLNESS:  Pt is a 3 y/o (31 week expremie) who presents with fever and decreased oral intake. Pt with 4 day h/o fever. With tmax 103. Over the past 4 days pt with periods of fussiness, not wanting to do anything, decreased activity and over the last 2 days continue decreased oral intake. Pt has body aches, occasional chills and occasionally saying ouch my head hurts per mom. Pt with no cough/congestion, no emesis, no loose stools.  Noted slight redness t othe lips. No rashes, no red eyes, no eye dc. No current sick contacts. In November pt had a cold that resolved. In December mom returned from business rip +COVID and all kids included pt tested + at that time via home testing.     EMERGENCY DEPARTMENT COURSE:  Presented afebrile then developed fever of 102.8. Labs obtained. IVF bolus given. Pt took some po and tolerated. With concern for continued  fluid hydration, was admitted.           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      IMMUNIZATIONS:  Up to date   SOCIAL HISTORY:  Lives with adoptive parents, siblings     FAMILY HISTORY:  family history is not on file.    VITAL SIGNS:  BP (!) 112/72   Pulse 120   Temp 99 °F (37.2 °C) (Temporal)   Resp 28   Wt 25 lb 5.7 oz (11.5 kg)   SpO2 100%     PHYSICAL EXAMINATION:    Gen:   Patient is asleep, arousable, fussy a times (like tired) but consolable, ,  nontoxic, in no apparent distress.  Skin:   No rashes, no petechiae.   HEENT:  Normocephalic atraumatic, extraocular muscles intact, no scleral icterus, no conjunctival injection bilaterally, oral mucous membranes  slightly moist,no red tongue ,  no nasal discharge, no nasal flaring, neck supple,   Lungs:   Clear to auscultation bilaterally, no wheezing, no coarseness, equal air entry    bilaterally.  Chest:   S1 and S2  Abdomen:  Soft, nontender, nondistended, positive bowel sounds, no hepatosplenomegaly, no rebound, no guarding.  Extremities:  No cyanosis, edema, clubbing, capillary refill less than 3 seconds.  Neuro:   No focal deficits.    DIAGNOSTIC DATA:     LABS:  Lab Results   Component Value Date    WBC 24.0 01/07/2024    HGB 11.0 01/07/2024    HCT 32.6 01/07/2024    .0 01/07/2024    CREATSERUM 0.17 01/07/2024    BUN 12 01/07/2024     01/07/2024    K 4.3 01/07/2024     01/07/2024    CO2 24.0 01/07/2024     01/07/2024    CA 10.1 01/07/2024    ALB 3.4 01/07/2024    ALKPHO 188 01/07/2024    BILT 0.4 01/07/2024    TP 8.9 01/07/2024    AST 17 01/07/2024    ALT 21 01/07/2024    CRP 5.76 01/07/2024    PGLU 102 01/07/2024       Lab Results   Component Value Date    COLORUR Yellow 01/07/2024    CLARITY  Clear 2024    SPECGRAVITY >=1.030 2024    GLUUR Negative 2024    BILUR  2024      Comment:      Refer to ictotest confirmation for bilirubin result.       KETUR >=160 2024    BLOODURINE Small 2024    PHURINE 6.0 2024    PROUR 30 mg/dL 2024    UROBILINOGEN 0.2 2024    NITRITE Negative 2024    LEUUR Negative 2024     COVID +     IMAGING:  CXR:    Normal cardiac and mediastinal contours.  Perihilar interstitial and bronchial wall thickening indicating viral bronchiolitis or reactive airway disease/asthma.  No discrete airspace consolidation.  The pleural spaces are clear   CXR reviewed.      ASSESSMENT:  3 y/o female (31 week expremie) admitted with febrile illness, with acute right OM, decreased oral intake with dehydration. Pt tested +COVID- unclear if from recent h/o COVID infection vs acute. Pt neurologically appropriate and hemodynamically stable.       PLAN:  Admit to peds.   IVF hydration.   Encourage po.   Continue ceftriaxone.  Monitor fever curve.   Monitor for respiratory symptoms.   Tylenol/motrin as needed.   Follow pending blood cx and UCx results.   Contact/droplet isolation.     Discussed patien'ts history of present illness, physical exam findings, plan of care with mom, mom  in agreement with plan.        Zack Lin MD  [phone]    Louisa Reed MD  2024  2:20 AM     Electronically signed by Louisa Reed MD on 2024  2:36 AM              Discharge Summary - D/C Summary      Discharge Summary signed by Luci Victor MD at 1/10/2024  5:06 PM  Version 1 of 1    Author: Luci Victor MD Service: Pediatrics Author Type: Physician    Filed: 1/10/2024  5:06 PM Date of Service: 1/10/2024 12:06 PM Status: Signed    : Luci Victor MD (Physician)       [facility] Discharge Summary    Alyssa Solomon Patient Status:  Inpatient    2021 MRN [id number]   Location [facility] 1SE-B Attending Valente  MD Luci   Hosp Day # 2 PCP Zack Lin MD     Admit Date: 1/7/2024    Discharge Date: 1/10/24    Admission Diagnoses:   Dehydration [E86.0]  Poor appetite [R63.0]  Febrile illness, acute [R50.9]  Acute right otitis media [H66.91]    Discharge Diagnoses:   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 Reed's H&P):   Pt is a 3 y/o (31 week expremie) who presents with fever and decreased oral intake. Pt with 4 day h/o fever. With tmax 103. Over the past 4 days pt with periods of fussiness, not wanting to do anything, decreased activity and over the last 2 days continue decreased oral intake. Pt has body aches, occasional chills and occasionally saying ouch my head hurts per mom. Pt with no cough/congestion, no emesis, no loose stools.  Noted slight redness t othe lips. No rashes, no red eyes, no eye dc. No current sick contacts. In November pt had a cold that resolved. In December mom returned from business rip +COVID and all kids included pt tested + at that time via home testing.      EMERGENCY DEPARTMENT COURSE:  Presented afebrile then developed fever of 102.8. Labs obtained. IVF bolus given. Pt took some po and tolerated. With concern for continued fluid hydration, was admitted.        Hospital Course:   Patient is a 2 year old female recently covid + on 12/14 with URI sx admitted to Pediatrics with prolonged fevers, poor PO intake, and recent diarrhea since 12/9 complicated by rt OM and covid + (likely from past infection).     Mother notes pt with head ache and neck pain. Pt was holding her left side of neck at home.      This morning, pt continues with note-able right sided preference in neck. Decision was to pursue MRI brain and add neck imaging.  Last ceftriaxone x3 was given yesterday 11pm.     Pt continues to have fevers, last fever 100.7F at 4pm and 101.1F at 4am.   Pt with  12 episodes of watery diarrhea yesterday which was difficult to collect. Pending stool sample collection to send for GiPCR.     Pt went for MRI brain and neck w/ and w/o contrast.   MRI showed 4.1cm centrally necrotic mass or collection centered within the rt parapharyngeal space. Abscess vs sarcoma. Mass effect noted on the rt cervical internal carotid artery and nasopharyngeal airway.     ENT recommended transfer to [facility] since pt will need intensive care monitoring and careful biopsy of mass.     Physical Exam:    BP (!) 128/69 (BP Location: Left leg)   Pulse 117   Temp 100.4 °F (38 °C) (Axillary)   Resp 26   Wt 25 lb 5.7 oz (11.5 kg)   SpO2 99%     General:             Patient is awake, alert, irritable, nontoxic, in no apparent distress.  Skin:                   No rashes, no petechiae.   HEENT:             Prefers the right side of her neck, negative kernig and brudzinski, lifts both legs without issues, snores with sleep. MMM, oropharynx clear, conjunctiva clear  Pulmonary:        Clear to auscultation bilaterally, no wheezing, no coarseness, equal air entry                       bilaterally.  Cardiac:             Regular rate and rhythm, no murmur.  Abdomen:          Soft, nontender without rebound or guarding, nondistended, positive bowel sounds, no masses,  no hepatosplenomegaly.  Extremities:       No cyanosis, edema, clubbing, capillary refill less than 3 seconds.  Neuro:                No focal deficits.         Significant Labs:   Results for orders placed or performed during the hospital encounter of 01/07/24   Comp Metabolic Panel (14)   Result Value Ref Range    Glucose 105 (H) 70 - 99 mg/dL    Sodium 134 (L) 136 - 145 mmol/L    Potassium 4.3 3.5 - 5.1 mmol/L    Chloride 102 99 - 111 mmol/L    CO2 24.0 21.0 - 32.0 mmol/L    Anion Gap 8 0 - 18 mmol/L    BUN 12 9 - 23 mg/dL    Creatinine 0.17 (L) 0.30 - 0.70 mg/dL    Calcium, Total 10.1 8.8 - 10.8 mg/dL    Calculated Osmolality 278 275 - 295  mOsm/kg    eGFR-Cr      AST 17 15 - 37 U/L    ALT 21 13 - 56 U/L    Alkaline Phosphatase 188 150 - 420 U/L    Bilirubin, Total 0.4 0.1 - 2.0 mg/dL    Total Protein 8.9 (H) 6.4 - 8.2 g/dL    Albumin 3.4 3.4 - 5.0 g/dL    Globulin  5.5 (H) 2.8 - 4.4 g/dL    A/G Ratio 0.6 (L) 1.0 - 2.0   Urinalysis, Routine   Result Value Ref Range    Urine Color Yellow Yellow    Clarity Urine Clear Clear    Spec Gravity >=1.030 1.005 - 1.030    Glucose Urine Negative Negative mg/dL    Bilirubin Urine      Ketones Urine >=160 (A) Negative mg/dL    Blood Urine Small (A) Negative    pH Urine 6.0 5.0 - 8.0    Protein Urine 30 mg/dL (A) Negative mg/dL    Urobilinogen Urine 0.2 <2.0 mg/dL    Nitrite Urine Negative Negative    Leukocyte Esterase Urine Negative Negative    WBC Urine 1-5 0 - 5 /HPF    RBC Urine 0-2 0 - 2 /HPF    Bacteria Urine None Seen None Seen /HPF    Squamous Epi. Cells Few (A) None Seen /HPF    Renal Tubular Epithelial Cells None Seen None Seen /HPF    Transitional Cells None Seen None Seen /HPF    Yeast Urine None Seen None Seen /HPF   C-Reactive Protein   Result Value Ref Range    C-Reactive Protein 5.76 (H) <0.30 mg/dL   Procalcitonin   Result Value Ref Range    Procalcitonin 0.12 <0.16 ng/mL   UA Microscopic only, urine   Result Value Ref Range    WBC Urine 1-5 0 - 5 /HPF    RBC Urine 0-2 0 - 2 /HPF    Bacteria Urine None Seen None Seen /HPF    Squamous Epi. Cells Few (A) None Seen /HPF    Renal Tubular Epithelial Cells None Seen None Seen /HPF    Transitional Cells None Seen None Seen /HPF    Yeast Urine None Seen None Seen /HPF   Ictotest   Result Value Ref Range    Ictotest Negative Negative   Troponin I (High Sensitivity)   Result Value Ref Range    Troponin I (High Sensitivity) 3 <=54 ng/L   Pro Beta Natriuretic Peptide   Result Value Ref Range    Pro-Beta Natriuretic Peptide 427 (H) <125 pg/mL   Prothrombin Time (PT)   Result Value Ref Range    PT 14.9 (H) 11.6 - 14.8 seconds    INR 1.16 <3.00   PTT, Activated    Result Value Ref Range    PTT 27.8 24.0 - 36.0 seconds   D-Dimer   Result Value Ref Range    D-Dimer 1.26 (H) <0.50 ug/mL FEU   Comp Metabolic Panel (14)   Result Value Ref Range    Glucose 101 (H) 70 - 99 mg/dL    Sodium 137 136 - 145 mmol/L    Potassium 4.0 3.5 - 5.1 mmol/L    Chloride 106 99 - 111 mmol/L    CO2 25.0 21.0 - 32.0 mmol/L    Anion Gap 6 0 - 18 mmol/L    BUN 4 (L) 9 - 23 mg/dL    Creatinine <0.15 (L) 0.30 - 0.70 mg/dL    Calcium, Total 9.1 8.8 - 10.8 mg/dL    Calculated Osmolality 281 275 - 295 mOsm/kg    eGFR-Cr      AST 25 15 - 37 U/L    ALT 22 13 - 56 U/L    Alkaline Phosphatase 158 150 - 420 U/L    Bilirubin, Total 0.3 0.1 - 2.0 mg/dL    Total Protein 7.1 6.4 - 8.2 g/dL    Albumin 2.6 (L) 3.4 - 5.0 g/dL    Globulin  4.5 (H) 2.8 - 4.4 g/dL    A/G Ratio 0.6 (L) 1.0 - 2.0   C-Reactive Protein   Result Value Ref Range    C-Reactive Protein 5.94 (H) <0.30 mg/dL   Pro Beta Natriuretic Peptide   Result Value Ref Range    Pro-Beta Natriuretic Peptide 356 (H) <125 pg/mL   D-Dimer   Result Value Ref Range    D-Dimer 1.34 (H) <0.50 ug/mL FEU   POCT Glucose   Result Value Ref Range    POC Glucose 102 (H) 70 - 99 mg/dL   RAINBOW DRAW GOLD   Result Value Ref Range    Hold Gold Auto Resulted    RAINBOW DRAW LAVENDER   Result Value Ref Range    Hold Lavender Auto Resulted    SARS-CoV-2/Flu A and B/RSV by PCR (GeneXpert)    Specimen: Nares; Other   Result Value Ref Range    SARS-CoV-2 (COVID-19) - (GeneXpert) Not Detected Not Detected    Influenza A by PCR Negative Negative    Influenza B by PCR Negative Negative    RSV by PCR Negative Negative   Blood Culture    Specimen: Blood,peripheral   Result Value Ref Range    Blood Culture Result No Growth 2 Days    $$$$Respiratory Flu Expanded Panel + Covid-19$$$$    Specimen: Nasopharyngeal swab; Other   Result Value Ref Range    SARS-CoV-2 PCR: Detected (A) Not Detected    Adenovirus PCR: Not Detected Not Detected    Coronavirus 229E PCR: Not Detected Not Detected     Coronavirus Hku1 PCR: Not Detected Not Detected    Coronavirus Nl63 PCR: Not Detected Not Detected    Coronavirus Oc43 PCR: Not Detected Not Detected    Metapneumovirus PCR: Not Detected Not Detected    Rhinovirus/Entero PCR: Not Detected Not Detected    Influenza A PCR: Not Detected Not Detected    Influenza B PCR: Not Detected Not Detected    Parainfluenza 1 PCR: Not Detected Not Detected    Parainfluenza 2 PCR Not Detected Not Detected    Parainfluenza 3 PCR Not Detected Not Detected    Parainfluenza 4 PCR Not Detected Not Detected    Resp Syncytial Virus PCR Not Detected Not Detected    Bordetella Pertussis PCR Not Detected Not Detected    Bordetella Parapertussis PCR Not Detected Not Detected    Chlamydia pneumonia PCR: Not Detected Not Detected    Mycoplasma pneumonia PCR: Not Detected Not Detected   Urine Culture, Routine    Specimen: Urine, clean catch   Result Value Ref Range    Urine Culture No Growth at 18-24 hrs.    CBC W/ DIFFERENTIAL   Result Value Ref Range    WBC 24.0 (H) 5.5 - 15.5 x10(3) uL    RBC 4.28 3.80 - 5.20 x10(6)uL    HGB 11.0 11.0 - 14.5 g/dL    HCT 32.6 32.0 - 45.0 %    .0 (H) 150.0 - 450.0 10(3)uL    MCV 76.2 75.0 - 87.0 fL    MCH 25.7 24.0 - 31.0 pg    MCHC 33.7 31.0 - 37.0 g/dL    RDW 12.8 %    Neutrophil Absolute Prelim 18.97 (H) 1.50 - 8.50 x10 (3) uL    Neutrophil Absolute 18.97 (H) 1.50 - 8.50 x10(3) uL    Lymphocyte Absolute 2.94 (L) 3.00 - 9.50 x10(3) uL    Monocyte Absolute 1.84 (H) 0.10 - 1.00 x10(3) uL    Eosinophil Absolute 0.00 0.00 - 0.70 x10(3) uL    Basophil Absolute 0.06 0.00 - 0.20 x10(3) uL    Immature Granulocyte Absolute 0.14 0.00 - 1.00 x10(3) uL    Neutrophil % 79.1 %    Lymphocyte % 12.3 %    Monocyte % 7.7 %    Eosinophil % 0.0 %    Basophil % 0.3 %    Immature Granulocyte % 0.6 %   CBC W/ DIFFERENTIAL   Result Value Ref Range    WBC 19.9 (H) 5.5 - 15.5 x10(3) uL    RBC 3.98 3.80 - 5.20 x10(6)uL    HGB 10.3 (L) 11.0 - 14.5 g/dL    HCT 30.0 (L) 32.0 - 45.0  %    .0 (H) 150.0 - 450.0 10(3)uL    MCV 75.4 75.0 - 87.0 fL    MCH 25.9 24.0 - 31.0 pg    MCHC 34.3 31.0 - 37.0 g/dL    RDW 12.8 %    Neutrophil Absolute Prelim 12.91 (H) 1.50 - 8.50 x10 (3) uL    Neutrophil Absolute 12.91 (H) 1.50 - 8.50 x10(3) uL    Lymphocyte Absolute 5.04 3.00 - 9.50 x10(3) uL    Monocyte Absolute 1.78 (H) 0.10 - 1.00 x10(3) uL    Eosinophil Absolute 0.03 0.00 - 0.70 x10(3) uL    Basophil Absolute 0.08 0.00 - 0.20 x10(3) uL    Immature Granulocyte Absolute 0.10 0.00 - 1.00 x10(3) uL    Neutrophil % 64.7 %    Lymphocyte % 25.3 %    Monocyte % 8.9 %    Eosinophil % 0.2 %    Basophil % 0.4 %    Immature Granulocyte % 0.5 %       Pending Labs: none    Imaging studies:  MRI NECK (W+WO)(CPT=70543)    Result Date: 1/10/2024  CONCLUSION:   1. There is a 4.1 cm centrally necrotic mass or collection centered within the right parapharyngeal space.  Differential considerations would include sarcoma or abscess.  Surgical consultation is recommended.  2. There are mildly prominent bilateral cervical lymph nodes which are nonspecific.  Clinical correlation with physical exam is recommended.   Critical results were discussed with Dr. Victor at 1537 hours on 1/10/2024.. Critical results were read back.    LOCATION:  [facility]   Dictated by (CST): Stromberg, LeRoy, MD on 1/10/2024 at 4:06 PM     Finalized by (CST): Stromberg, LeRoy, MD on 1/10/2024 at 4:16 PM       MRI BRAIN (W+WO) (CPT=70553)    Result Date: 1/10/2024  CONCLUSION:  1. There is a 4.4 cm necrotic mass or collection centered within the right parapharyngeal space with invasion of the right longus coli muscle and posterior mass effect on the right cervical internal carotid artery.  This may represent an abscess or a neoplastic process such as sarcoma.  Surgical consultation is recommended.  Of note, there is mass effect on the nasopharyngeal airway. 2. No acute intracranial abnormality is identified.  Critical results were discussed with   Victor at 1537 hours on 1/10/2024. Critical results were read back.   LOCATION:  [facility]    Dictated by (CST): Stromberg, LeRoy, MD on 1/10/2024 at 3:20 PM     Finalized by (CST): Stromberg, LeRoy, MD on 1/10/2024 at 3:41 PM       CT ABDOMEN+PELVIS(CONTRAST ONLY)(CPT=74177)    Result Date: 1/9/2024  CONCLUSION:  As described above, there is a scant degree of intra-abdominal ascites, with mild diffuse gaseous and fluid distension of the gastrointestinal tract diffusely.  No jenny bowel dilatation.  No regions of abnormal bowel wall thickening, abnormal bowel enhancement or evidence of intussusception.  The appendix appears unremarkable.  The appearance is suggestive of a nonspecific enteritis and ileus.   LOCATION:  [facility]   Dictated by (CST): Jasvir White DO on 1/09/2024 at 10:23 PM     Finalized by (CST): Jasvir White DO on 1/09/2024 at 10:29 PM       US ABDOMEN LIMITED (CPT=76705)    Result Date: 1/8/2024  CONCLUSION:  No sonographic evidence for intussusception.   LOCATION:  [facility]   Dictated by (CST): Jorge Valderrama MD on 1/08/2024 at 11:34 AM     Finalized by (CST): Jorge Valderrama MD on 1/08/2024 at 11:34 AM       XR CHEST AP PORTABLE  (CPT=71045)    Result Date: 1/7/2024  CONCLUSION:   Normal cardiac and mediastinal contours.  Perihilar interstitial and bronchial wall thickening indicating viral bronchiolitis or reactive airway disease/asthma.  No discrete airspace consolidation.  The pleural spaces are clear.     LOCATION:  [facility]      Dictated by (CST): Luz Márquez MD on 1/07/2024 at 11:39 PM     Finalized by (CST): Luz Márquez MD on 1/07/2024 at 11:39 PM          Discharge Medications:     Discharge Medications      You have not been prescribed any medications.         Discharge Instructions:  Transfer to [facility] for ENT biopsy of necrotic abscess vs tumor in parapharyngeal space.    Parents demonstrate understanding of the discharge plans.  PCP, Zack Lin MD,  was sent a discharge  summary    Discharge preparation time: 35 minutes spent examining patient, discussing hospitalization and discharge management with family, and preparing discharge summary and orders.    Luci Victor MD  1/10/2024  4:39 PM          Electronically signed by Luci Victor MD on 1/10/2024  5:06 PM           Physical Therapy Notes (last 72 hours)  Notes from 1/7/2024  7:36 PM through 1/10/2024  7:36 PM   No notes of this type exist for this encounter.     Occupational Therapy Notes (last 72 hours)  Notes from 1/7/2024  7:36 PM through 1/10/2024  7:36 PM   No notes of this type exist for this encounter.     Video Swallow Study Notes    No notes of this type exist for this encounter.     SLP Notes    No notes of this type exist for this encounter.     Multidisciplinary Problems     Active Goals        Problem: Patient/Family Goals    Goal Priority Disciplines Outcome Interventions   Patient/Family Long Term Goal     Interdisciplinary Progressing    Description: Patient's Long Term Goal: to go home    Interventions:  - advance diet as tolerated  Monitor I/Os  Monitor fevers  Give tylenol/motrin as needed    - See additional Care Plan goals for specific interventions   Patient/Family Short Term Goal     Interdisciplinary Progressing    Description: Patient's Short Term Goal: no fevers    Interventions:   - monitor fevers  Give tylenol and motrin as needed  - See additional Care Plan goals for specific interventions                Other

## 2024-10-24 NOTE — ED PROVIDER NOTE - WR INTERPRETED BY 1
My signature below certifies that the above stated patient is homebound and upon completion of the Face-To-Face encounter, has the need for intermittent skilled nursing, physical therapy and/or speech or occupational therapy services in their home for their current diagnosis as outlined in their initial plan of care. These services will continue to be monitored by myself or another physician. Hair Watson

## 2024-10-24 NOTE — ED PROVIDER NOTE - WR INTERPRETATION 1
CXR negative - No pneumothorax, No opacities, No free air; likely atelectasis visualized in upper lobes

## 2024-10-24 NOTE — ED PROVIDER NOTE - CLINICAL SUMMARY MEDICAL DECISION MAKING FREE TEXT BOX
Patient is a 89-year-old white female with history of hypertension presents to the emergency department here at [facility] with left-sided back pain getting worse over the past few days which increases with movement pending and slight bleeding with deep inspiration.  No fever no chills no cough no chest pain no hemoptysis.  Case require independently performed history and physical labs x-rays IV fluids and medication.

## 2024-10-24 NOTE — ED PROVIDER NOTE - ATTENDING APP SHARED VISIT CONTRIBUTION OF CARE
Frail elderly white female with limited muscle mass HEENT normocephalic atraumatic neck is supple no C-spine tenderness lungs coarse breath sounds bilaterally chest wall was nontender heart S1-S2 any murmurs rubs gallops neurologically patient's awake alert oriented x 3 without any focal neurologic deficits.  I agree with plan of management outlined by PA.

## 2024-10-24 NOTE — ED ADULT NURSE NOTE - HIV OFFER
EXAM DESCRIPTION:  CT CHEST WITHOUT



IMAGES COMPLETED DATE/TIME:  10/26/2020 8:15 am



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 
NEOPLASM OF UPPER LOBE, RIGHT BRONCHUS OR L



COMPARISON:  4/24/2000



TECHNIQUE:  CT scan performed of the chest without intravenous contrast.  Images reviewed with lung, 
soft tissue and bone windows.  Reconstructed coronal and sagittal MPR images reviewed.  All images st
ored on PACS.

All CT scanners at this facility use dose modulation, iterative reconstruction, and/or weight based d
osing when appropriate to reduce radiation dose to as low as reasonably achievable (ALARA).

[facility]: Dose Right  [facility]: CareDose    [facility]: Dose Right    [facility]: Teradose 4D    [facility]: Smart Technologies



RADIATION DOSE:  CT Rad equipment meets quality standard of care and radiation dose reduction techniq
ues were employed. CTDIvol: 10.2 mGy. DLP: 431 mGy-cm. mGy.



LIMITATIONS:  No technical limitations.



FINDINGS:  LUNGS AND PLEURA: Stable scarring in the periphery the right middle lobe.  No pulmonary no
dules.  No consolidation or pleural effusion.  Small central bullae is again noted.  Stable scarring 
in the right apex.

HILAR AND MEDIASTINAL STRUCTURES: No identified masses or abnormal nodes.  No obvious aneurysm.

HEART AND VASCULAR STRUCTURES: No aneurysm.  No pericardial effusion.

UPPER ABDOMEN: No significant findings.  Limited exam.

THYROID AND OTHER SOFT TISSUES: No masses.  No adenopathy.

BONES: Stable in appearance.  Chronic compression deformities in the mid dorsal spine.

HARDWARE: None in the chest.

OTHER: No other significant findings.



IMPRESSION:  Stable CT of the chest.  No evidence recurrent disease or metastatic disease.



TECHNICAL DOCUMENTATION:  JOB ID:  [id number]

Quality ID # [id number]: Final reports with documentation of one or more dose reduction techniques (e.g., Au
tomated exposure control, adjustment of the mA and/or kV according to patient size, use of iterative 
reconstruction technique)

 2011 [employer]- All Rights Reserved



Reading location - IP/workstation name: [email or web address] out

## 2024-10-24 NOTE — ED PROVIDER NOTE - OBJECTIVE STATEMENT
89y/o female presents to ED with fatigue, Left sided back pain for past 3-4 days. Pt states she develops similar symptoms when she gets UTIs and states she relatively frequently develops UTIs. Additionally reports reduced appetite. States back pain is upper Left around scapula, worse with movement. Denies fever, nausea, emesis, abdominal pain, dysuria, hematuria.

## 2024-10-24 NOTE — ED PROVIDER NOTE - CARE PROVIDER_API CALL
Marin Crystal  Family Medicine  [address] [place] [id number]  Phone: [phone]  Fax: [phone]  Established Patient  Follow Up Time: Routine

## 2024-10-24 NOTE — ED PROVIDER NOTE - NSFOLLOWUPINSTRUCTIONS_ED_ALL_ED_FT
Return to ER if new or worsening symptoms develop.    Take medication as prescribed.    Schedule follow-up with PCP.

## 2024-10-24 NOTE — ED ADULT NURSE NOTE - OBJECTIVE STATEMENT
Patient complaining of flank pain with dizziness, states who intermittently have some dizziness, and has h/x of UTIs. Patient is AOx4, safety precautions in place, awaiting evaluation

## 2024-10-24 NOTE — ED PROVIDER NOTE - PATIENT PORTAL LINK FT
You can access the FollowMyHealth Patient Portal offered by [facility] by registering at the following website: [email or web address]. By joining [employer]’s FollowMyHealth portal, you will also be able to view your health information using other applications (apps) compatible with our system.

## 2024-10-24 NOTE — ED ADULT NURSE NOTE - AVIAN FLU SYMPTOMS
Vidhi from [facility] called to clarify information for 4 medications and would like a call back.    No

## 2024-10-24 NOTE — ED PROVIDER NOTE - DIFFERENTIAL DIAGNOSIS
Musculoskeletal pain rib fracture possible though doubt vertebral fracture possible left lower lobe pneumonia with pleuritis Differential Diagnosis

## 2024-10-24 NOTE — ED ADULT TRIAGE NOTE - CHIEF COMPLAINT QUOTE
pt has left flank pain worsening since sunday. pt does not have any  symptoms however gets frequent UTIs. gave urine sample at [facility] which has not resulted yet

## 2024-12-04 ENCOUNTER — APPOINTMENT (OUTPATIENT)
Dept: OBGYN | Facility: CLINIC | Age: 88
End: 2024-12-04